# Patient Record
Sex: FEMALE | Race: WHITE | NOT HISPANIC OR LATINO | Employment: FULL TIME | URBAN - NONMETROPOLITAN AREA
[De-identification: names, ages, dates, MRNs, and addresses within clinical notes are randomized per-mention and may not be internally consistent; named-entity substitution may affect disease eponyms.]

---

## 2018-02-27 ENCOUNTER — APPOINTMENT (OUTPATIENT)
Dept: CT IMAGING | Facility: HOSPITAL | Age: 48
End: 2018-02-27

## 2018-02-27 ENCOUNTER — APPOINTMENT (OUTPATIENT)
Dept: GENERAL RADIOLOGY | Facility: HOSPITAL | Age: 48
End: 2018-02-27

## 2018-02-27 ENCOUNTER — HOSPITAL ENCOUNTER (INPATIENT)
Facility: HOSPITAL | Age: 48
LOS: 1 days | Discharge: HOME | End: 2018-02-28
Attending: EMERGENCY MEDICINE | Admitting: FAMILY MEDICINE

## 2018-02-27 ENCOUNTER — APPOINTMENT (OUTPATIENT)
Dept: MRI IMAGING | Facility: HOSPITAL | Age: 48
End: 2018-02-27

## 2018-02-27 DIAGNOSIS — I63.9 CEREBROVASCULAR ACCIDENT (CVA), UNSPECIFIED MECHANISM (HCC): Primary | ICD-10-CM

## 2018-02-27 DIAGNOSIS — Z74.09 IMPAIRED MOBILITY AND ADLS: ICD-10-CM

## 2018-02-27 DIAGNOSIS — Z78.9 IMPAIRED MOBILITY AND ADLS: ICD-10-CM

## 2018-02-27 DIAGNOSIS — Z74.09 IMPAIRED MOBILITY: ICD-10-CM

## 2018-02-27 LAB
ABO GROUP BLD: NORMAL
ALBUMIN SERPL-MCNC: 4.3 G/DL (ref 3.5–5)
ALBUMIN/GLOB SERPL: 1.5 G/DL (ref 1.1–2.5)
ALP SERPL-CCNC: 55 U/L (ref 24–120)
ALT SERPL W P-5'-P-CCNC: 18 U/L (ref 0–54)
ANION GAP SERPL CALCULATED.3IONS-SCNC: 11 MMOL/L (ref 4–13)
APTT PPP: 33 SECONDS (ref 24.1–34.8)
AST SERPL-CCNC: 28 U/L (ref 7–45)
AT III PPP CHRO-ACNC: 95 % (ref 84–123)
BACTERIA UR QL AUTO: ABNORMAL /HPF
BASOPHILS # BLD AUTO: 0.06 10*3/MM3 (ref 0–0.2)
BASOPHILS NFR BLD AUTO: 0.5 % (ref 0–2)
BILIRUB SERPL-MCNC: 0.5 MG/DL (ref 0.1–1)
BILIRUB UR QL STRIP: NEGATIVE
BLD GP AB SCN SERPL QL: NEGATIVE
BUN BLD-MCNC: 14 MG/DL (ref 5–21)
BUN/CREAT SERPL: 17.5 (ref 7–25)
CALCIUM SPEC-SCNC: 9.3 MG/DL (ref 8.4–10.4)
CHLORIDE SERPL-SCNC: 102 MMOL/L (ref 98–110)
CLARITY UR: CLEAR
CO2 SERPL-SCNC: 27 MMOL/L (ref 24–31)
COLOR UR: YELLOW
CREAT BLD-MCNC: 0.8 MG/DL (ref 0.5–1.4)
D DIMER PPP FEU-MCNC: 1.35 MG/L (FEU) (ref 0–0.5)
DEPRECATED RDW RBC AUTO: 38.5 FL (ref 40–54)
EOSINOPHIL # BLD AUTO: 0.07 10*3/MM3 (ref 0–0.7)
EOSINOPHIL NFR BLD AUTO: 0.6 % (ref 0–4)
ERYTHROCYTE [DISTWIDTH] IN BLOOD BY AUTOMATED COUNT: 12.3 % (ref 12–15)
ERYTHROCYTE [SEDIMENTATION RATE] IN BLOOD: 6 MM/HR (ref 0–20)
GFR SERPL CREATININE-BSD FRML MDRD: 76 ML/MIN/1.73
GLOBULIN UR ELPH-MCNC: 2.9 GM/DL
GLUCOSE BLD-MCNC: 98 MG/DL (ref 70–100)
GLUCOSE BLDC GLUCOMTR-MCNC: 93 MG/DL (ref 70–130)
GLUCOSE UR STRIP-MCNC: NEGATIVE MG/DL
HCT VFR BLD AUTO: 40.2 % (ref 37–47)
HGB BLD-MCNC: 13.9 G/DL (ref 12–16)
HGB UR QL STRIP.AUTO: ABNORMAL
HOLD SPECIMEN: NORMAL
HOLD SPECIMEN: NORMAL
HYALINE CASTS UR QL AUTO: ABNORMAL /LPF
IMM GRANULOCYTES # BLD: 0.03 10*3/MM3 (ref 0–0.03)
IMM GRANULOCYTES NFR BLD: 0.3 % (ref 0–5)
INR PPP: 0.94 (ref 0.91–1.09)
KETONES UR QL STRIP: NEGATIVE
LEUKOCYTE ESTERASE UR QL STRIP.AUTO: NEGATIVE
LYMPHOCYTES # BLD AUTO: 2.57 10*3/MM3 (ref 0.72–4.86)
LYMPHOCYTES NFR BLD AUTO: 21.8 % (ref 15–45)
MCH RBC QN AUTO: 29.8 PG (ref 28–32)
MCHC RBC AUTO-ENTMCNC: 34.6 G/DL (ref 33–36)
MCV RBC AUTO: 86.1 FL (ref 82–98)
MONOCYTES # BLD AUTO: 0.82 10*3/MM3 (ref 0.19–1.3)
MONOCYTES NFR BLD AUTO: 7 % (ref 4–12)
NEUTROPHILS # BLD AUTO: 8.24 10*3/MM3 (ref 1.87–8.4)
NEUTROPHILS NFR BLD AUTO: 69.8 % (ref 39–78)
NITRITE UR QL STRIP: NEGATIVE
NRBC BLD MANUAL-RTO: 0 /100 WBC (ref 0–0)
PH UR STRIP.AUTO: 7 [PH] (ref 5–8)
PLATELET # BLD AUTO: 326 10*3/MM3 (ref 130–400)
PMV BLD AUTO: 8.7 FL (ref 6–12)
POTASSIUM BLD-SCNC: 4.3 MMOL/L (ref 3.5–5.3)
PROT SERPL-MCNC: 7.2 G/DL (ref 6.3–8.7)
PROT UR QL STRIP: NEGATIVE
PROTHROMBIN TIME: 12.8 SECONDS (ref 11.9–14.6)
RBC # BLD AUTO: 4.67 10*6/MM3 (ref 4.2–5.4)
RBC # UR: ABNORMAL /HPF
REF LAB TEST METHOD: ABNORMAL
RH BLD: POSITIVE
SODIUM BLD-SCNC: 140 MMOL/L (ref 135–145)
SP GR UR STRIP: 1.01 (ref 1–1.03)
SQUAMOUS #/AREA URNS HPF: ABNORMAL /HPF
TROPONIN I SERPL-MCNC: <0.012 NG/ML (ref 0–0.03)
UROBILINOGEN UR QL STRIP: ABNORMAL
WBC NRBC COR # BLD: 11.79 10*3/MM3 (ref 4.8–10.8)
WBC UR QL AUTO: ABNORMAL /HPF
WHOLE BLOOD HOLD SPECIMEN: NORMAL
WHOLE BLOOD HOLD SPECIMEN: NORMAL

## 2018-02-27 PROCEDURE — 80053 COMPREHEN METABOLIC PANEL: CPT | Performed by: EMERGENCY MEDICINE

## 2018-02-27 PROCEDURE — 85730 THROMBOPLASTIN TIME PARTIAL: CPT | Performed by: EMERGENCY MEDICINE

## 2018-02-27 PROCEDURE — 85705 THROMBOPLASTIN INHIBITION: CPT | Performed by: PSYCHIATRY & NEUROLOGY

## 2018-02-27 PROCEDURE — 86235 NUCLEAR ANTIGEN ANTIBODY: CPT | Performed by: PSYCHIATRY & NEUROLOGY

## 2018-02-27 PROCEDURE — 70450 CT HEAD/BRAIN W/O DYE: CPT

## 2018-02-27 PROCEDURE — 85305 CLOT INHIBIT PROT S TOTAL: CPT | Performed by: PSYCHIATRY & NEUROLOGY

## 2018-02-27 PROCEDURE — 84484 ASSAY OF TROPONIN QUANT: CPT | Performed by: EMERGENCY MEDICINE

## 2018-02-27 PROCEDURE — 86901 BLOOD TYPING SEROLOGIC RH(D): CPT | Performed by: EMERGENCY MEDICINE

## 2018-02-27 PROCEDURE — 85302 CLOT INHIBIT PROT C ANTIGEN: CPT | Performed by: PSYCHIATRY & NEUROLOGY

## 2018-02-27 PROCEDURE — 70498 CT ANGIOGRAPHY NECK: CPT

## 2018-02-27 PROCEDURE — 25010000002 DIPHENHYDRAMINE PER 50 MG: Performed by: PSYCHIATRY & NEUROLOGY

## 2018-02-27 PROCEDURE — 85670 THROMBIN TIME PLASMA: CPT | Performed by: PSYCHIATRY & NEUROLOGY

## 2018-02-27 PROCEDURE — 85303 CLOT INHIBIT PROT C ACTIVITY: CPT | Performed by: PSYCHIATRY & NEUROLOGY

## 2018-02-27 PROCEDURE — 86850 RBC ANTIBODY SCREEN: CPT | Performed by: EMERGENCY MEDICINE

## 2018-02-27 PROCEDURE — 36415 COLL VENOUS BLD VENIPUNCTURE: CPT | Performed by: PSYCHIATRY & NEUROLOGY

## 2018-02-27 PROCEDURE — 85610 PROTHROMBIN TIME: CPT | Performed by: EMERGENCY MEDICINE

## 2018-02-27 PROCEDURE — 25010000002 METHYLPREDNISOLONE PER 125 MG: Performed by: FAMILY MEDICINE

## 2018-02-27 PROCEDURE — 99285 EMERGENCY DEPT VISIT HI MDM: CPT

## 2018-02-27 PROCEDURE — 86225 DNA ANTIBODY NATIVE: CPT | Performed by: PSYCHIATRY & NEUROLOGY

## 2018-02-27 PROCEDURE — 85651 RBC SED RATE NONAUTOMATED: CPT | Performed by: PSYCHIATRY & NEUROLOGY

## 2018-02-27 PROCEDURE — 25010000002 DIPHENHYDRAMINE PER 50 MG: Performed by: FAMILY MEDICINE

## 2018-02-27 PROCEDURE — 86900 BLOOD TYPING SEROLOGIC ABO: CPT | Performed by: EMERGENCY MEDICINE

## 2018-02-27 PROCEDURE — 85732 THROMBOPLASTIN TIME PARTIAL: CPT | Performed by: PSYCHIATRY & NEUROLOGY

## 2018-02-27 PROCEDURE — 83090 ASSAY OF HOMOCYSTEINE: CPT | Performed by: PSYCHIATRY & NEUROLOGY

## 2018-02-27 PROCEDURE — 86147 CARDIOLIPIN ANTIBODY EA IG: CPT | Performed by: PSYCHIATRY & NEUROLOGY

## 2018-02-27 PROCEDURE — 81001 URINALYSIS AUTO W/SCOPE: CPT | Performed by: PSYCHIATRY & NEUROLOGY

## 2018-02-27 PROCEDURE — 71045 X-RAY EXAM CHEST 1 VIEW: CPT

## 2018-02-27 PROCEDURE — 86146 BETA-2 GLYCOPROTEIN ANTIBODY: CPT | Performed by: PSYCHIATRY & NEUROLOGY

## 2018-02-27 PROCEDURE — 93005 ELECTROCARDIOGRAM TRACING: CPT | Performed by: EMERGENCY MEDICINE

## 2018-02-27 PROCEDURE — 85306 CLOT INHIBIT PROT S FREE: CPT | Performed by: PSYCHIATRY & NEUROLOGY

## 2018-02-27 PROCEDURE — 70551 MRI BRAIN STEM W/O DYE: CPT

## 2018-02-27 PROCEDURE — 85379 FIBRIN DEGRADATION QUANT: CPT | Performed by: PSYCHIATRY & NEUROLOGY

## 2018-02-27 PROCEDURE — 99284 EMERGENCY DEPT VISIT MOD MDM: CPT | Performed by: PSYCHIATRY & NEUROLOGY

## 2018-02-27 PROCEDURE — 93010 ELECTROCARDIOGRAM REPORT: CPT | Performed by: INTERNAL MEDICINE

## 2018-02-27 PROCEDURE — 85300 ANTITHROMBIN III ACTIVITY: CPT | Performed by: PSYCHIATRY & NEUROLOGY

## 2018-02-27 PROCEDURE — 87086 URINE CULTURE/COLONY COUNT: CPT | Performed by: PSYCHIATRY & NEUROLOGY

## 2018-02-27 PROCEDURE — 0 IOPAMIDOL PER 1 ML: Performed by: EMERGENCY MEDICINE

## 2018-02-27 PROCEDURE — 70496 CT ANGIOGRAPHY HEAD: CPT

## 2018-02-27 PROCEDURE — 85613 RUSSELL VIPER VENOM DILUTED: CPT | Performed by: PSYCHIATRY & NEUROLOGY

## 2018-02-27 PROCEDURE — 85025 COMPLETE CBC W/AUTO DIFF WBC: CPT | Performed by: EMERGENCY MEDICINE

## 2018-02-27 PROCEDURE — 82962 GLUCOSE BLOOD TEST: CPT

## 2018-02-27 PROCEDURE — 81241 F5 GENE: CPT | Performed by: PSYCHIATRY & NEUROLOGY

## 2018-02-27 RX ORDER — LISINOPRIL 10 MG/1
10 TABLET ORAL DAILY
COMMUNITY
End: 2018-02-28 | Stop reason: HOSPADM

## 2018-02-27 RX ORDER — SODIUM CHLORIDE 0.9 % (FLUSH) 0.9 %
10 SYRINGE (ML) INJECTION AS NEEDED
Status: DISCONTINUED | OUTPATIENT
Start: 2018-02-27 | End: 2018-02-27

## 2018-02-27 RX ORDER — METHYLPREDNISOLONE SODIUM SUCCINATE 125 MG/2ML
125 INJECTION, POWDER, LYOPHILIZED, FOR SOLUTION INTRAMUSCULAR; INTRAVENOUS ONCE
Status: COMPLETED | OUTPATIENT
Start: 2018-02-27 | End: 2018-02-27

## 2018-02-27 RX ORDER — FLUOXETINE HYDROCHLORIDE 20 MG/1
20 CAPSULE ORAL TAKE AS DIRECTED
COMMUNITY

## 2018-02-27 RX ORDER — METHYLPREDNISOLONE SODIUM SUCCINATE 125 MG/2ML
125 INJECTION, POWDER, LYOPHILIZED, FOR SOLUTION INTRAMUSCULAR; INTRAVENOUS EVERY 6 HOURS
Status: DISCONTINUED | OUTPATIENT
Start: 2018-02-27 | End: 2018-02-28 | Stop reason: HOSPADM

## 2018-02-27 RX ORDER — LISINOPRIL 10 MG/1
10 TABLET ORAL DAILY
Status: DISCONTINUED | OUTPATIENT
Start: 2018-02-27 | End: 2018-02-27

## 2018-02-27 RX ORDER — ACETAMINOPHEN 325 MG/1
650 TABLET ORAL EVERY 4 HOURS PRN
Status: DISCONTINUED | OUTPATIENT
Start: 2018-02-27 | End: 2018-02-28 | Stop reason: HOSPADM

## 2018-02-27 RX ORDER — ASPIRIN 81 MG/1
81 TABLET, CHEWABLE ORAL DAILY
Status: DISCONTINUED | OUTPATIENT
Start: 2018-02-27 | End: 2018-02-28 | Stop reason: HOSPADM

## 2018-02-27 RX ORDER — KETOROLAC TROMETHAMINE 10 MG/1
10 TABLET, FILM COATED ORAL EVERY 6 HOURS PRN
Status: ON HOLD | COMMUNITY
End: 2020-07-30

## 2018-02-27 RX ORDER — ACETAMINOPHEN 650 MG/1
650 SUPPOSITORY RECTAL EVERY 4 HOURS PRN
Status: DISCONTINUED | OUTPATIENT
Start: 2018-02-27 | End: 2018-02-28 | Stop reason: HOSPADM

## 2018-02-27 RX ORDER — DIPHENHYDRAMINE HYDROCHLORIDE 50 MG/ML
25 INJECTION INTRAMUSCULAR; INTRAVENOUS EVERY 6 HOURS
Status: DISCONTINUED | OUTPATIENT
Start: 2018-02-27 | End: 2018-02-28 | Stop reason: HOSPADM

## 2018-02-27 RX ORDER — SODIUM CHLORIDE 0.9 % (FLUSH) 0.9 %
1-10 SYRINGE (ML) INJECTION AS NEEDED
Status: DISCONTINUED | OUTPATIENT
Start: 2018-02-27 | End: 2018-02-28 | Stop reason: HOSPADM

## 2018-02-27 RX ORDER — ASPIRIN 300 MG/1
300 SUPPOSITORY RECTAL DAILY
Status: DISCONTINUED | OUTPATIENT
Start: 2018-02-27 | End: 2018-02-28 | Stop reason: HOSPADM

## 2018-02-27 RX ORDER — ATORVASTATIN CALCIUM 40 MG/1
80 TABLET, FILM COATED ORAL NIGHTLY
Status: DISCONTINUED | OUTPATIENT
Start: 2018-02-27 | End: 2018-02-28 | Stop reason: HOSPADM

## 2018-02-27 RX ADMIN — ASPIRIN 81 MG 81 MG: 81 TABLET ORAL at 15:23

## 2018-02-27 RX ADMIN — DESMOPRESSIN ACETATE 80 MG: 0.2 TABLET ORAL at 20:07

## 2018-02-27 RX ADMIN — METHYLPREDNISOLONE SODIUM SUCCINATE 125 MG: 125 INJECTION, POWDER, FOR SOLUTION INTRAMUSCULAR; INTRAVENOUS at 16:49

## 2018-02-27 RX ADMIN — DIPHENHYDRAMINE HYDROCHLORIDE 25 MG: 50 INJECTION, SOLUTION INTRAMUSCULAR; INTRAVENOUS at 12:05

## 2018-02-27 RX ADMIN — METHYLPREDNISOLONE SODIUM SUCCINATE 125 MG: 125 INJECTION, POWDER, FOR SOLUTION INTRAMUSCULAR; INTRAVENOUS at 23:05

## 2018-02-27 RX ADMIN — LISINOPRIL 10 MG: 10 TABLET ORAL at 15:23

## 2018-02-27 RX ADMIN — IOPAMIDOL 100 ML: 755 INJECTION, SOLUTION INTRAVENOUS at 12:46

## 2018-02-27 RX ADMIN — DIPHENHYDRAMINE HYDROCHLORIDE 25 MG: 50 INJECTION, SOLUTION INTRAMUSCULAR; INTRAVENOUS at 20:02

## 2018-02-28 ENCOUNTER — APPOINTMENT (OUTPATIENT)
Dept: CARDIOLOGY | Facility: HOSPITAL | Age: 48
End: 2018-02-28
Attending: PSYCHIATRY & NEUROLOGY

## 2018-02-28 ENCOUNTER — APPOINTMENT (OUTPATIENT)
Dept: ULTRASOUND IMAGING | Facility: HOSPITAL | Age: 48
End: 2018-02-28

## 2018-02-28 VITALS
RESPIRATION RATE: 18 BRPM | BODY MASS INDEX: 30.33 KG/M2 | HEART RATE: 80 BPM | SYSTOLIC BLOOD PRESSURE: 128 MMHG | TEMPERATURE: 97.5 F | HEIGHT: 63 IN | OXYGEN SATURATION: 95 % | WEIGHT: 171.2 LBS | DIASTOLIC BLOOD PRESSURE: 77 MMHG

## 2018-02-28 LAB
ARTICHOKE IGE QN: 172 MG/DL (ref 0–99)
BH CV ECHO MEAS - AO MAX PG (FULL): 0.77 MMHG
BH CV ECHO MEAS - AO MAX PG: 7.1 MMHG
BH CV ECHO MEAS - AO MEAN PG (FULL): 1 MMHG
BH CV ECHO MEAS - AO MEAN PG: 4 MMHG
BH CV ECHO MEAS - AO ROOT AREA (BSA CORRECTED): 1.7
BH CV ECHO MEAS - AO ROOT AREA: 7.5 CM^2
BH CV ECHO MEAS - AO ROOT DIAM: 3.1 CM
BH CV ECHO MEAS - AO V2 MAX: 133 CM/SEC
BH CV ECHO MEAS - AO V2 MEAN: 98.7 CM/SEC
BH CV ECHO MEAS - AO V2 VTI: 27 CM
BH CV ECHO MEAS - AVA(I,A): 2.5 CM^2
BH CV ECHO MEAS - AVA(I,D): 2.5 CM^2
BH CV ECHO MEAS - AVA(V,A): 2.7 CM^2
BH CV ECHO MEAS - AVA(V,D): 2.7 CM^2
BH CV ECHO MEAS - BSA(HAYCOCK): 1.9 M^2
BH CV ECHO MEAS - BSA: 1.8 M^2
BH CV ECHO MEAS - BZI_BMI: 30.3 KILOGRAMS/M^2
BH CV ECHO MEAS - BZI_METRIC_HEIGHT: 160 CM
BH CV ECHO MEAS - BZI_METRIC_WEIGHT: 77.6 KG
BH CV ECHO MEAS - CONTRAST EF 4CH: 71.3 ML/M^2
BH CV ECHO MEAS - EDV(CUBED): 69.9 ML
BH CV ECHO MEAS - EDV(MOD-SP4): 75.5 ML
BH CV ECHO MEAS - EDV(TEICH): 75.1 ML
BH CV ECHO MEAS - EF(CUBED): 59.4 %
BH CV ECHO MEAS - EF(MOD-SP4): 71.3 %
BH CV ECHO MEAS - EF(TEICH): 51.5 %
BH CV ECHO MEAS - ESV(CUBED): 28.4 ML
BH CV ECHO MEAS - ESV(MOD-SP4): 21.7 ML
BH CV ECHO MEAS - ESV(TEICH): 36.4 ML
BH CV ECHO MEAS - FS: 26 %
BH CV ECHO MEAS - IVS/LVPW: 1
BH CV ECHO MEAS - IVSD: 0.66 CM
BH CV ECHO MEAS - LA DIMENSION: 2.4 CM
BH CV ECHO MEAS - LA/AO: 0.77
BH CV ECHO MEAS - LV DIASTOLIC VOL/BSA (35-75): 41.7 ML/M^2
BH CV ECHO MEAS - LV MASS(C)D: 74.3 GRAMS
BH CV ECHO MEAS - LV MASS(C)DI: 41.1 GRAMS/M^2
BH CV ECHO MEAS - LV MAX PG: 6.3 MMHG
BH CV ECHO MEAS - LV MEAN PG: 3 MMHG
BH CV ECHO MEAS - LV SYSTOLIC VOL/BSA (12-30): 12 ML/M^2
BH CV ECHO MEAS - LV V1 MAX: 125.5 CM/SEC
BH CV ECHO MEAS - LV V1 MEAN: 82.9 CM/SEC
BH CV ECHO MEAS - LV V1 VTI: 24 CM
BH CV ECHO MEAS - LVIDD: 4.1 CM
BH CV ECHO MEAS - LVIDS: 3.1 CM
BH CV ECHO MEAS - LVLD AP4: 8.1 CM
BH CV ECHO MEAS - LVLS AP4: 6.2 CM
BH CV ECHO MEAS - LVOT AREA (M): 2.8 CM^2
BH CV ECHO MEAS - LVOT AREA: 2.8 CM^2
BH CV ECHO MEAS - LVOT DIAM: 1.9 CM
BH CV ECHO MEAS - LVPWD: 0.64 CM
BH CV ECHO MEAS - MV A MAX VEL: 94.6 CM/SEC
BH CV ECHO MEAS - MV DEC TIME: 0.21 SEC
BH CV ECHO MEAS - MV E MAX VEL: 73.5 CM/SEC
BH CV ECHO MEAS - MV E/A: 0.78
BH CV ECHO MEAS - SI(AO): 112.6 ML/M^2
BH CV ECHO MEAS - SI(CUBED): 23 ML/M^2
BH CV ECHO MEAS - SI(LVOT): 37.6 ML/M^2
BH CV ECHO MEAS - SI(MOD-SP4): 29.7 ML/M^2
BH CV ECHO MEAS - SI(TEICH): 21.4 ML/M^2
BH CV ECHO MEAS - SV(AO): 203.8 ML
BH CV ECHO MEAS - SV(CUBED): 41.6 ML
BH CV ECHO MEAS - SV(LVOT): 68 ML
BH CV ECHO MEAS - SV(MOD-SP4): 53.8 ML
BH CV ECHO MEAS - SV(TEICH): 38.6 ML
BH CV ECHO MEAS - TR MAX VEL: 142 CM/SEC
CARDIOLIPIN IGG SER IA-ACNC: <9 GPL U/ML (ref 0–14)
CARDIOLIPIN IGM SER IA-ACNC: 14 MPL U/ML (ref 0–12)
CENTROMERE B AB SER-ACNC: <0.2 AI (ref 0–0.9)
CHOLEST SERPL-MCNC: 222 MG/DL (ref 130–200)
CHROMATIN AB SERPL-ACNC: <0.2 AI (ref 0–0.9)
DSDNA AB SER-ACNC: <1 IU/ML (ref 0–9)
E/E' RATIO: 6.3
ENA JO1 AB SER-ACNC: <0.2 AI (ref 0–0.9)
ENA RNP AB SER-ACNC: 3.2 AI (ref 0–0.9)
ENA SCL70 AB SER-ACNC: <0.2 AI (ref 0–0.9)
ENA SM AB SER-ACNC: <0.2 AI (ref 0–0.9)
ENA SS-A AB SER-ACNC: <0.2 AI (ref 0–0.9)
ENA SS-B AB SER-ACNC: <0.2 AI (ref 0–0.9)
HBA1C MFR BLD: 5.3 %
HCYS SERPL-SCNC: 11.9 UMOL/L (ref 0–15)
HDLC SERPL-MCNC: 55 MG/DL
LDLC/HDLC SERPL: 2.8 {RATIO}
LEFT ATRIUM VOLUME INDEX: 13.1 ML/M2
LEFT ATRIUM VOLUME: 23.7 CM3
LV EF 2D ECHO EST: 70 %
Lab: ABNORMAL
MAXIMAL PREDICTED HEART RATE: 171 BPM
PROT S PPP-ACNC: 71 % (ref 60–150)
STRESS TARGET HR: 145 BPM
TRIGL SERPL-MCNC: 64 MG/DL (ref 0–149)

## 2018-02-28 PROCEDURE — 25010000002 DIPHENHYDRAMINE PER 50 MG: Performed by: FAMILY MEDICINE

## 2018-02-28 PROCEDURE — 97165 OT EVAL LOW COMPLEX 30 MIN: CPT

## 2018-02-28 PROCEDURE — 99233 SBSQ HOSP IP/OBS HIGH 50: CPT | Performed by: PSYCHIATRY & NEUROLOGY

## 2018-02-28 PROCEDURE — G8980 MOBILITY D/C STATUS: HCPCS

## 2018-02-28 PROCEDURE — G8978 MOBILITY CURRENT STATUS: HCPCS

## 2018-02-28 PROCEDURE — 25010000002 PERFLUTREN 6.52 MG/ML SUSPENSION: Performed by: FAMILY MEDICINE

## 2018-02-28 PROCEDURE — 93306 TTE W/DOPPLER COMPLETE: CPT

## 2018-02-28 PROCEDURE — 80061 LIPID PANEL: CPT | Performed by: FAMILY MEDICINE

## 2018-02-28 PROCEDURE — 25010000002 METHYLPREDNISOLONE PER 125 MG: Performed by: FAMILY MEDICINE

## 2018-02-28 PROCEDURE — 93306 TTE W/DOPPLER COMPLETE: CPT | Performed by: INTERNAL MEDICINE

## 2018-02-28 PROCEDURE — 97161 PT EVAL LOW COMPLEX 20 MIN: CPT

## 2018-02-28 PROCEDURE — 93970 EXTREMITY STUDY: CPT

## 2018-02-28 PROCEDURE — G8988 SELF CARE GOAL STATUS: HCPCS

## 2018-02-28 PROCEDURE — 93970 EXTREMITY STUDY: CPT | Performed by: SURGERY

## 2018-02-28 PROCEDURE — G8987 SELF CARE CURRENT STATUS: HCPCS

## 2018-02-28 PROCEDURE — G8989 SELF CARE D/C STATUS: HCPCS

## 2018-02-28 PROCEDURE — 83036 HEMOGLOBIN GLYCOSYLATED A1C: CPT | Performed by: FAMILY MEDICINE

## 2018-02-28 PROCEDURE — G8979 MOBILITY GOAL STATUS: HCPCS

## 2018-02-28 RX ORDER — ASPIRIN 81 MG/1
81 TABLET, CHEWABLE ORAL DAILY
Start: 2018-03-01 | End: 2018-03-02

## 2018-02-28 RX ORDER — METHYLPREDNISOLONE 4 MG/1
TABLET ORAL
Qty: 1 EACH | Refills: 0 | Status: SHIPPED | OUTPATIENT
Start: 2018-02-28 | End: 2018-03-03 | Stop reason: HOSPADM

## 2018-02-28 RX ORDER — ATORVASTATIN CALCIUM 80 MG/1
80 TABLET, FILM COATED ORAL NIGHTLY
Qty: 30 TABLET | Refills: 0 | Status: SHIPPED | OUTPATIENT
Start: 2018-02-28 | End: 2019-07-09 | Stop reason: SDUPTHER

## 2018-02-28 RX ADMIN — ASPIRIN 81 MG 81 MG: 81 TABLET ORAL at 09:53

## 2018-02-28 RX ADMIN — METHYLPREDNISOLONE SODIUM SUCCINATE 125 MG: 125 INJECTION, POWDER, FOR SOLUTION INTRAMUSCULAR; INTRAVENOUS at 05:07

## 2018-02-28 RX ADMIN — DIPHENHYDRAMINE HYDROCHLORIDE 25 MG: 50 INJECTION, SOLUTION INTRAMUSCULAR; INTRAVENOUS at 12:13

## 2018-02-28 RX ADMIN — DIPHENHYDRAMINE HYDROCHLORIDE 25 MG: 50 INJECTION, SOLUTION INTRAMUSCULAR; INTRAVENOUS at 00:57

## 2018-02-28 RX ADMIN — METHYLPREDNISOLONE SODIUM SUCCINATE 125 MG: 125 INJECTION, POWDER, FOR SOLUTION INTRAMUSCULAR; INTRAVENOUS at 17:35

## 2018-02-28 RX ADMIN — PERFLUTREN: 6.52 INJECTION, SUSPENSION INTRAVENOUS at 12:58

## 2018-02-28 RX ADMIN — METHYLPREDNISOLONE SODIUM SUCCINATE 125 MG: 125 INJECTION, POWDER, FOR SOLUTION INTRAMUSCULAR; INTRAVENOUS at 11:35

## 2018-02-28 RX ADMIN — FOLIC ACID-PYRIDOXINE-CYANOCOBALAMIN TAB 2.5-25-2 MG 1 TABLET: 2.5-25-2 TAB at 15:43

## 2018-02-28 RX ADMIN — DIPHENHYDRAMINE HYDROCHLORIDE 25 MG: 50 INJECTION, SOLUTION INTRAMUSCULAR; INTRAVENOUS at 18:08

## 2018-02-28 RX ADMIN — DIPHENHYDRAMINE HYDROCHLORIDE 25 MG: 50 INJECTION, SOLUTION INTRAMUSCULAR; INTRAVENOUS at 07:05

## 2018-03-01 LAB
B2 GLYCOPROT1 IGA SER-ACNC: <9 GPI IGA UNITS (ref 0–25)
B2 GLYCOPROT1 IGG SER-ACNC: <9 GPI IGG UNITS (ref 0–20)
B2 GLYCOPROT1 IGM SER-ACNC: <9 GPI IGM UNITS (ref 0–32)
BACTERIA SPEC AEROBE CULT: NORMAL
LA NT DPL PPP: 39.3 SEC (ref 0–55)
LA NT DPL/LA NT HPL PPP-RTO: 0.92 RATIO (ref 0–1.4)
LA NT PLATELET PPP: 37.2 SEC (ref 0–51.9)
LUPUS ANTICOAGULANT REFLEX: NORMAL
PROT S FREE PPP-ACNC: 115 % (ref 57–157)
PROTEIN S-FUNCTIONAL: 90 % (ref 63–140)
SCREEN DRVVT: 35.2 SEC (ref 0–47)
THROMBIN TIME: 18.2 SEC (ref 0–23)

## 2018-03-02 ENCOUNTER — HOSPITAL ENCOUNTER (OUTPATIENT)
Facility: HOSPITAL | Age: 48
Setting detail: OBSERVATION
Discharge: HOME | End: 2018-03-03
Attending: FAMILY MEDICINE | Admitting: INTERNAL MEDICINE

## 2018-03-02 DIAGNOSIS — T78.40XA ALLERGIC REACTION, INITIAL ENCOUNTER: Primary | ICD-10-CM

## 2018-03-02 LAB
ALBUMIN SERPL-MCNC: 3.6 G/DL (ref 3.5–5)
ALBUMIN/GLOB SERPL: 1.4 G/DL (ref 1.1–2.5)
ALP SERPL-CCNC: 40 U/L (ref 24–120)
ALT SERPL W P-5'-P-CCNC: 23 U/L (ref 0–54)
ANION GAP SERPL CALCULATED.3IONS-SCNC: 11 MMOL/L (ref 4–13)
AST SERPL-CCNC: 24 U/L (ref 7–45)
BASOPHILS # BLD AUTO: 0.02 10*3/MM3 (ref 0–0.2)
BASOPHILS NFR BLD AUTO: 0.1 % (ref 0–2)
BILIRUB SERPL-MCNC: 1 MG/DL (ref 0.1–1)
BUN BLD-MCNC: 20 MG/DL (ref 5–21)
BUN/CREAT SERPL: 23.8 (ref 7–25)
CALCIUM SPEC-SCNC: 8.9 MG/DL (ref 8.4–10.4)
CHLORIDE SERPL-SCNC: 98 MMOL/L (ref 98–110)
CO2 SERPL-SCNC: 31 MMOL/L (ref 24–31)
CREAT BLD-MCNC: 0.84 MG/DL (ref 0.5–1.4)
DEPRECATED RDW RBC AUTO: 39 FL (ref 40–54)
EOSINOPHIL # BLD AUTO: 0.13 10*3/MM3 (ref 0–0.7)
EOSINOPHIL NFR BLD AUTO: 0.9 % (ref 0–4)
ERYTHROCYTE [DISTWIDTH] IN BLOOD BY AUTOMATED COUNT: 12.5 % (ref 12–15)
GFR SERPL CREATININE-BSD FRML MDRD: 72 ML/MIN/1.73
GLOBULIN UR ELPH-MCNC: 2.6 GM/DL
GLUCOSE BLD-MCNC: 88 MG/DL (ref 70–100)
HCT VFR BLD AUTO: 42.5 % (ref 37–47)
HGB BLD-MCNC: 14.6 G/DL (ref 12–16)
IMM GRANULOCYTES # BLD: 0.04 10*3/MM3 (ref 0–0.03)
IMM GRANULOCYTES NFR BLD: 0.3 % (ref 0–5)
LYMPHOCYTES # BLD AUTO: 2.57 10*3/MM3 (ref 0.72–4.86)
LYMPHOCYTES NFR BLD AUTO: 18.6 % (ref 15–45)
MCH RBC QN AUTO: 29.5 PG (ref 28–32)
MCHC RBC AUTO-ENTMCNC: 34.4 G/DL (ref 33–36)
MCV RBC AUTO: 85.9 FL (ref 82–98)
MONOCYTES # BLD AUTO: 0.45 10*3/MM3 (ref 0.19–1.3)
MONOCYTES NFR BLD AUTO: 3.3 % (ref 4–12)
NEUTROPHILS # BLD AUTO: 10.58 10*3/MM3 (ref 1.87–8.4)
NEUTROPHILS NFR BLD AUTO: 76.8 % (ref 39–78)
NRBC BLD MANUAL-RTO: 0 /100 WBC (ref 0–0)
PLATELET # BLD AUTO: 350 10*3/MM3 (ref 130–400)
PMV BLD AUTO: 8.9 FL (ref 6–12)
POTASSIUM BLD-SCNC: 4.2 MMOL/L (ref 3.5–5.3)
PROT C AG PPP IA-ACNC: 106 % (ref 60–150)
PROT SERPL-MCNC: 6.2 G/DL (ref 6.3–8.7)
PRT C ACTIVITY (CHROMOGENIC): 136 %
RBC # BLD AUTO: 4.95 10*6/MM3 (ref 4.2–5.4)
SODIUM BLD-SCNC: 140 MMOL/L (ref 135–145)
WBC NRBC COR # BLD: 13.79 10*3/MM3 (ref 4.8–10.8)

## 2018-03-02 PROCEDURE — 96375 TX/PRO/DX INJ NEW DRUG ADDON: CPT

## 2018-03-02 PROCEDURE — 25010000002 DEXAMETHASONE PER 1 MG: Performed by: INTERNAL MEDICINE

## 2018-03-02 PROCEDURE — G0378 HOSPITAL OBSERVATION PER HR: HCPCS

## 2018-03-02 PROCEDURE — 25010000002 DIPHENHYDRAMINE PER 50 MG: Performed by: NURSE PRACTITIONER

## 2018-03-02 PROCEDURE — 80053 COMPREHEN METABOLIC PANEL: CPT | Performed by: NURSE PRACTITIONER

## 2018-03-02 PROCEDURE — 96374 THER/PROPH/DIAG INJ IV PUSH: CPT

## 2018-03-02 PROCEDURE — 25010000002 METHYLPREDNISOLONE PER 125 MG: Performed by: NURSE PRACTITIONER

## 2018-03-02 PROCEDURE — 96376 TX/PRO/DX INJ SAME DRUG ADON: CPT

## 2018-03-02 PROCEDURE — 85025 COMPLETE CBC W/AUTO DIFF WBC: CPT | Performed by: NURSE PRACTITIONER

## 2018-03-02 PROCEDURE — 96361 HYDRATE IV INFUSION ADD-ON: CPT

## 2018-03-02 PROCEDURE — 99284 EMERGENCY DEPT VISIT MOD MDM: CPT

## 2018-03-02 RX ORDER — METHYLPREDNISOLONE SODIUM SUCCINATE 125 MG/2ML
125 INJECTION, POWDER, LYOPHILIZED, FOR SOLUTION INTRAMUSCULAR; INTRAVENOUS ONCE
Status: COMPLETED | OUTPATIENT
Start: 2018-03-02 | End: 2018-03-02

## 2018-03-02 RX ORDER — ATORVASTATIN CALCIUM 40 MG/1
80 TABLET, FILM COATED ORAL NIGHTLY
Status: DISCONTINUED | OUTPATIENT
Start: 2018-03-02 | End: 2018-03-03 | Stop reason: HOSPADM

## 2018-03-02 RX ORDER — CALCIUM CARBONATE 200(500)MG
1 TABLET,CHEWABLE ORAL 2 TIMES DAILY PRN
Status: DISCONTINUED | OUTPATIENT
Start: 2018-03-02 | End: 2018-03-03 | Stop reason: HOSPADM

## 2018-03-02 RX ORDER — DEXAMETHASONE SODIUM PHOSPHATE 4 MG/ML
2 INJECTION, SOLUTION INTRA-ARTICULAR; INTRALESIONAL; INTRAMUSCULAR; INTRAVENOUS; SOFT TISSUE EVERY 6 HOURS SCHEDULED
Status: DISCONTINUED | OUTPATIENT
Start: 2018-03-02 | End: 2018-03-02

## 2018-03-02 RX ORDER — SODIUM CHLORIDE 0.9 % (FLUSH) 0.9 %
10 SYRINGE (ML) INJECTION AS NEEDED
Status: DISCONTINUED | OUTPATIENT
Start: 2018-03-02 | End: 2018-03-03 | Stop reason: HOSPADM

## 2018-03-02 RX ORDER — SODIUM CHLORIDE 9 MG/ML
75 INJECTION, SOLUTION INTRAVENOUS CONTINUOUS
Status: DISCONTINUED | OUTPATIENT
Start: 2018-03-02 | End: 2018-03-03 | Stop reason: HOSPADM

## 2018-03-02 RX ORDER — DEXAMETHASONE SODIUM PHOSPHATE 4 MG/ML
4 INJECTION, SOLUTION INTRA-ARTICULAR; INTRALESIONAL; INTRAMUSCULAR; INTRAVENOUS; SOFT TISSUE EVERY 6 HOURS
Status: DISCONTINUED | OUTPATIENT
Start: 2018-03-02 | End: 2018-03-03 | Stop reason: HOSPADM

## 2018-03-02 RX ORDER — ACETAMINOPHEN 325 MG/1
650 TABLET ORAL EVERY 4 HOURS PRN
Status: DISCONTINUED | OUTPATIENT
Start: 2018-03-02 | End: 2018-03-03 | Stop reason: HOSPADM

## 2018-03-02 RX ORDER — ONDANSETRON 2 MG/ML
4 INJECTION INTRAMUSCULAR; INTRAVENOUS EVERY 6 HOURS PRN
Status: DISCONTINUED | OUTPATIENT
Start: 2018-03-02 | End: 2018-03-03 | Stop reason: HOSPADM

## 2018-03-02 RX ORDER — HYDROXYZINE HYDROCHLORIDE 25 MG/1
25 TABLET, FILM COATED ORAL EVERY 6 HOURS SCHEDULED
Status: DISCONTINUED | OUTPATIENT
Start: 2018-03-02 | End: 2018-03-03 | Stop reason: HOSPADM

## 2018-03-02 RX ORDER — CALCIUM CARBONATE 200(500)MG
1 TABLET,CHEWABLE ORAL 2 TIMES DAILY PRN
COMMUNITY
End: 2021-07-02

## 2018-03-02 RX ORDER — CLOPIDOGREL BISULFATE 75 MG/1
75 TABLET ORAL DAILY
Status: DISCONTINUED | OUTPATIENT
Start: 2018-03-02 | End: 2018-03-03 | Stop reason: HOSPADM

## 2018-03-02 RX ORDER — DIPHENHYDRAMINE HYDROCHLORIDE 50 MG/ML
12.5 INJECTION INTRAMUSCULAR; INTRAVENOUS ONCE
Status: COMPLETED | OUTPATIENT
Start: 2018-03-02 | End: 2018-03-02

## 2018-03-02 RX ORDER — FAMOTIDINE 10 MG/ML
20 INJECTION, SOLUTION INTRAVENOUS ONCE
Status: COMPLETED | OUTPATIENT
Start: 2018-03-02 | End: 2018-03-02

## 2018-03-02 RX ORDER — SODIUM CHLORIDE 0.9 % (FLUSH) 0.9 %
1-10 SYRINGE (ML) INJECTION AS NEEDED
Status: DISCONTINUED | OUTPATIENT
Start: 2018-03-02 | End: 2018-03-03 | Stop reason: HOSPADM

## 2018-03-02 RX ORDER — FLUOXETINE 10 MG/1
10 CAPSULE ORAL TAKE AS DIRECTED
Status: DISCONTINUED | OUTPATIENT
Start: 2018-03-02 | End: 2018-03-02

## 2018-03-02 RX ORDER — FAMOTIDINE 10 MG/ML
20 INJECTION, SOLUTION INTRAVENOUS EVERY 12 HOURS SCHEDULED
Status: DISCONTINUED | OUTPATIENT
Start: 2018-03-02 | End: 2018-03-03 | Stop reason: HOSPADM

## 2018-03-02 RX ADMIN — FAMOTIDINE 20 MG: 10 INJECTION INTRAVENOUS at 09:45

## 2018-03-02 RX ADMIN — METHYLPREDNISOLONE SODIUM SUCCINATE 125 MG: 125 INJECTION, POWDER, FOR SOLUTION INTRAMUSCULAR; INTRAVENOUS at 09:41

## 2018-03-02 RX ADMIN — FAMOTIDINE 20 MG: 10 INJECTION, SOLUTION INTRAVENOUS at 20:20

## 2018-03-02 RX ADMIN — DEXAMETHASONE SODIUM PHOSPHATE 4 MG: 4 INJECTION, SOLUTION INTRAMUSCULAR; INTRAVENOUS at 20:20

## 2018-03-02 RX ADMIN — SODIUM CHLORIDE 75 ML/HR: 9 INJECTION, SOLUTION INTRAVENOUS at 14:34

## 2018-03-02 RX ADMIN — Medication 10 ML: at 20:21

## 2018-03-02 RX ADMIN — DESMOPRESSIN ACETATE 80 MG: 0.2 TABLET ORAL at 20:21

## 2018-03-02 RX ADMIN — DEXAMETHASONE SODIUM PHOSPHATE 4 MG: 4 INJECTION, SOLUTION INTRAMUSCULAR; INTRAVENOUS at 14:57

## 2018-03-02 RX ADMIN — HYDROXYZINE HYDROCHLORIDE 25 MG: 25 TABLET ORAL at 23:24

## 2018-03-02 RX ADMIN — HYDROXYZINE HYDROCHLORIDE 25 MG: 25 TABLET ORAL at 14:57

## 2018-03-02 RX ADMIN — DIPHENHYDRAMINE HYDROCHLORIDE 12.5 MG: 50 INJECTION, SOLUTION INTRAMUSCULAR; INTRAVENOUS at 09:45

## 2018-03-02 RX ADMIN — HYDROXYZINE HYDROCHLORIDE 25 MG: 25 TABLET ORAL at 18:14

## 2018-03-02 RX ADMIN — CLOPIDOGREL 75 MG: 75 TABLET, FILM COATED ORAL at 14:57

## 2018-03-02 NOTE — ED PROVIDER NOTES
Subjective   HPI Comments: Patient is a 47-year-old  female that presents to the ER today with complaint of allergic reaction.  Patient was admitted to this facility from February 27 of 02/20/2017 with CVA.  Interstate the patient resolution of her symptoms.  During her stay the patient also developed a bacterial rash as well as facial swelling.  The patient was given Benadryl and Solu-Medrol with some relief of her symptoms.  Patient was discharged home and advised to continue Benadryl Medrol Dosepak.  Patient states she has been taking Benadryl and a Medrol Dosepak last evening.  She states her symptoms are generally worse.  She denies any swelling to her face, she denies any difficult swallowing or shortness of breath.  She denies any throat swelling.  She denies any dysphagia.  The patient denies any new soaps or lotions or detergents at home.  She denies these symptoms occurring after receiving any type of IV contrast or other medications in the hospital.  She states that the symptoms began before she was started on any medications.  She presents to the ER today for further evaluation.  She presents ER today for further evaluation.    Patient is a 47 y.o. female presenting with allergic reaction.   History provided by:  Patient   used: No    Allergic Reaction   Presenting symptoms: itching and rash    Presenting symptoms: no difficulty breathing, no difficulty swallowing, no swelling and no wheezing    Severity:  Mild  Duration:  4 days  Prior allergic episodes:  No prior episodes  Context: not animal exposure, not chemicals, not cosmetics, not dairy/milk products, not eggs, not food allergies, not grass, not insect bite/sting, not jewelry/metal, not medications, not new detergents/soaps, not nuts and not poison ivy    Relieved by:  Nothing  Worsened by:  Nothing  Ineffective treatments:  None tried      Review of Systems   HENT: Negative for trouble swallowing.    Respiratory:  Negative for wheezing.    Skin: Positive for itching and rash.   All other systems reviewed and are negative.      Past Medical History:   Diagnosis Date   • Hypertension        Allergies   Allergen Reactions   • Aspirin Hives   • Lisinopril Swelling       Past Surgical History:   Procedure Laterality Date   • TUBAL ABDOMINAL LIGATION         No family history on file.    Social History     Social History   • Marital status:      Social History Main Topics   • Smoking status: Never Smoker   • Alcohol use No   • Drug use: No   • Sexual activity: Yes     Partners: Male           Objective   Physical Exam   Constitutional: She is oriented to person, place, and time. She appears well-developed and well-nourished.   HENT:   Head: Normocephalic and atraumatic.   Eyes: Conjunctivae are normal. Pupils are equal, round, and reactive to light.   Cardiovascular: Normal rate, regular rhythm and normal heart sounds.    Pulmonary/Chest: Effort normal and breath sounds normal.   Abdominal: Soft. Bowel sounds are normal.   Neurological: She is alert and oriented to person, place, and time.   Skin: Skin is warm and dry.        Psychiatric: She has a normal mood and affect.   Nursing note and vitals reviewed.      Procedures         ED Course  ED Course   Comment By Time   Pt also seen by Dr. Mobley today; pt symptoms only improved slightly with steroids, benadryl and pepcid. At this time call placed to hospitalist to discuss admission. Ami Polanco, APRN 03/02 1101   Case discussed with Dr. Marino per Dr. Mobley; pt will be admitted to hospitalist in stable cond at this time. Ami Polanco, APRN 03/02 1130   Pt updated and aware of plan. Ami Polanco, APRN 03/02 1130      No orders to display     Lab Results (last 24 hours)     Procedure Component Value Units Date/Time    CBC & Differential [[id number]] Collected:  03/02/18 0925    Specimen:  Blood Updated:  03/02/18 0938    Narrative:       The following orders  were created for panel order CBC & Differential.  Procedure                               Abnormality         Status                     ---------                               -----------         ------                     CBC Auto Differential[[id number]]        Abnormal            Final result                 Please view results for these tests on the individual orders.    Comprehensive Metabolic Panel [[id number]]  (Abnormal) Collected:  03/02/18 0925    Specimen:  Blood Updated:  03/02/18 0953     Glucose 88 mg/dL      BUN 20 mg/dL      Creatinine 0.84 mg/dL      Sodium 140 mmol/L      Potassium 4.2 mmol/L      Chloride 98 mmol/L      CO2 31.0 mmol/L      Calcium 8.9 mg/dL      Total Protein 6.2 (L) g/dL      Albumin 3.60 g/dL      ALT (SGPT) 23 U/L      AST (SGOT) 24 U/L      Alkaline Phosphatase 40 U/L      Total Bilirubin 1.0 mg/dL      eGFR Non African Amer 72 mL/min/1.73      Globulin 2.6 gm/dL      A/G Ratio 1.4 g/dL      BUN/Creatinine Ratio 23.8     Anion Gap 11.0 mmol/L     CBC Auto Differential [[id number]]  (Abnormal) Collected:  03/02/18 0925    Specimen:  Blood Updated:  03/02/18 0938     WBC 13.79 (H) 10*3/mm3      RBC 4.95 10*6/mm3      Hemoglobin 14.6 g/dL      Hematocrit 42.5 %      MCV 85.9 fL      MCH 29.5 pg      MCHC 34.4 g/dL      RDW 12.5 %      RDW-SD 39.0 (L) fl      MPV 8.9 fL      Platelets 350 10*3/mm3      Neutrophil % 76.8 %      Lymphocyte % 18.6 %      Monocyte % 3.3 (L) %      Eosinophil % 0.9 %      Basophil % 0.1 %      Immature Grans % 0.3 %      Neutrophils, Absolute 10.58 (H) 10*3/mm3      Lymphocytes, Absolute 2.57 10*3/mm3      Monocytes, Absolute 0.45 10*3/mm3      Eosinophils, Absolute 0.13 10*3/mm3      Basophils, Absolute 0.02 10*3/mm3      Immature Grans, Absolute 0.04 (H) 10*3/mm3      nRBC 0.0 /100 WBC                     MDM  Number of Diagnoses or Management Options  Allergic reaction, initial encounter: established and worsening     Amount and/or Complexity of Data  Reviewed  Clinical lab tests: ordered and reviewed  Review and summarize past medical records: yes  Discuss the patient with other providers: yes    Patient Progress  Patient progress: stable      Final diagnoses:   Allergic reaction, initial encounter            Ami Polanco, APRN  03/02/18 113

## 2018-03-02 NOTE — PLAN OF CARE
Problem: Patient Care Overview (Adult)  Goal: Plan of Care Review  Outcome: Ongoing (interventions implemented as appropriate)   03/02/18 1321   Coping/Psychosocial Response Interventions   Plan Of Care Reviewed With patient   Patient Care Overview   Progress progress toward functional goals is gradual   Outcome Evaluation   Outcome Summary/Follow up Plan Pt admit from ER with allergic reaction. Pt had taken lisinopril, took ASA today and made hives worse. medication given with little relief. cont to monitor.      Goal: Adult Individualization and Mutuality  Outcome: Ongoing (interventions implemented as appropriate)    Goal: Discharge Needs Assessment  Outcome: Ongoing (interventions implemented as appropriate)      Problem: Anaphylactic/Systemic Hypersensitivity Reaction (Adult)  Goal: Signs and Symptoms of Listed Potential Problems Will be Absent or Manageable (Anaphylactic/Systemic Hypersensitivity Reaction)  Outcome: Ongoing (interventions implemented as appropriate)

## 2018-03-02 NOTE — H&P
[facility] Medicine Services  HISTORY AND PHYSICAL    Date of Admission: 3/2/2018  Primary Care Physician: No Known Provider    Subjective     Chief Complaint: Diffuse rash, pruritus    History of Present Illness  This is a 47-year-old  female patient who resides in [place], [place].  She does not currently have a primary care physician.  She was just discharged from our facility on Wednesday, 2/28 by Dr. Olivia Marino.  She had presented to our facility on 2/27 with complaints of right facial numbness.  There was concern for possible stroke process.  MRI of the brain was done and showed a small focus of restricted diffusion in the region of the posterior limb of the left internal capsule.  She was seen by Dr. Paiz.  She was started on aspirin and atorvastatin.  She tells me today that the numbness of her right face has almost completely resolved.    The patient had also developed a rash during that admission and had some edema of her upper lip.  Her lisinopril was discontinued.  She was placed on a Medrol Dosepak at discharge.     Since going home, the rash is spread considerably.  She has had uncontrollable pruritus with it.  She has tried Benadryl in addition to the Medrol with little relief.  She perceives some slight difficulty with breathing yesterday.  No difficulty with swallowing.  No oral lesions.  No fevers, sweats, or chills.    She believes the rash started shortly after completing her MRI on 2/27.  The MRI was done without contrast.  She does not believe that she had taken any medications prior to the MRI.  Given that all this happened on her prior admission, I am having a hard time establishing a timeline on that.  The only medications that she received on her last admission were her regular home medications and aspirin and atorvastatin.  Lisinopril was discontinued.  She was not sent home on an alternative blood pressure medication.  She did have  a CTA of head and neck, but claims the rash was already noticeable prior to receiving iodinated contrast.    She does admit that the rash has worsened since taking a daily aspirin.  She does not want to take aspirin any longer.  She had tolerated full dose aspirin is remotely.  She does take Toradol sometimes for painful menstrual cycles.  She has not had problems with nonsteroidals in the past.    Dr. Mobley felt that she should be admitted for this.     Review of Systems   Otherwise complete ROS reviewed and negative except as mentioned in the HPI.    Past Medical History:   Past Medical History:   Diagnosis Date   • Hypertension      Past Surgical History:  Past Surgical History:   Procedure Laterality Date   • TUBAL ABDOMINAL LIGATION       Social History:  reports that she has never smoked. She does not have any smokeless tobacco history on file. She reports that she does not drink alcohol or use illicit drugs.    Family History: Her mother has breast cancer.  She had a questionable lesion of her own in her right breast that was biopsied and was benign.  She now has a titanium marker for subsequent mammograms.    Allergies:  Allergies   Allergen Reactions   • Aspirin Hives, Swelling and Rash   • Lisinopril Swelling and Rash     Medications:  Prior to Admission medications    Medication Sig Start Date End Date Taking? Authorizing Provider   atorvastatin (LIPITOR) 80 MG tablet Take 1 tablet by mouth Every Night. 2/28/18  Yes Olivia Marino, DO   calcium carbonate (TUMS) 500 MG chewable tablet Chew 1 tablet 2 (Two) Times a Day As Needed for Indigestion or Heartburn.   Yes Historical Provider, MD   FLUoxetine (PROzac) 10 MG capsule Take 10 mg by mouth Take As Directed. Only takes during menstrual cycle   Yes Historical Provider, MD   folic acid-pyridoxine-cyanocobalamin (FOLBIC) 2.5-25-2 MG tablet tablet Take 1 tablet by mouth Daily. 3/1/18  Yes Olivia Marino DO   ketorolac (TORADOL) 10 MG tablet Take 10  "mg by mouth Every 6 (Six) Hours As Needed for Moderate Pain  (takes monthly during menstrual cycle).   Yes Historical Provider, MD   MethylPREDNISolone (MEDROL, BEN,) 4 MG tablet Take as directed on package instructions. 2/28/18  Yes Olivia Marino DO   aspirin 81 MG chewable tablet Chew 1 tablet Daily. 3/1/18 3/2/18  Olivia Marino DO     Objective     Vital Signs: /68 (BP Location: Right arm, Patient Position: Sitting)  Pulse 81  Temp 97.2 °F (36.2 °C) (Temporal Artery )   Resp 18  Ht 160 cm (63\")  Wt 78.9 kg (174 lb)  LMP 02/06/2018  SpO2 98%  BMI 30.82 kg/m2  Physical Exam   Constitutional: She is oriented to person, place, and time. She appears well-developed and well-nourished.   Up on the side of bed.  No distress.  Nontoxic.  Looks well.  Discussed with her nurse, April.  No family present.   HENT:   Head: Normocephalic and atraumatic.   Eyes: EOM are normal. Pupils are equal, round, and reactive to light.   Cardiovascular: Normal rate and regular rhythm.    Pulmonary/Chest: Effort normal and breath sounds normal.   Musculoskeletal: Normal range of motion. She exhibits no edema.   Neurological: She is alert and oriented to person, place, and time. She displays normal reflexes. No cranial nerve deficit. She exhibits normal muscle tone.   Skin: Skin is warm and dry. Rash (Maculopapular, diffuse, most noticeable on the trunk and legs.  Spares the palms and soles.  Blanches.  No targetoid lesions.  No oral or posterior pharynx lesions.) noted.   Psychiatric: She has a normal mood and affect. Her behavior is normal. Judgment and thought content normal.     Results Reviewed:  Lab Results (last 24 hours)     Procedure Component Value Units Date/Time    CBC & Differential [[id number]] Collected:  03/02/18 0925    Specimen:  Blood Updated:  03/02/18 0938    Narrative:       The following orders were created for panel order CBC & Differential.  Procedure                               Abnormality "         Status                     ---------                               -----------         ------                     CBC Auto Differential[[id number]]        Abnormal            Final result                 Please view results for these tests on the individual orders.    CBC Auto Differential [[id number]]  (Abnormal) Collected:  03/02/18 0925    Specimen:  Blood Updated:  03/02/18 0938     WBC 13.79 (H) 10*3/mm3      RBC 4.95 10*6/mm3      Hemoglobin 14.6 g/dL      Hematocrit 42.5 %      MCV 85.9 fL      MCH 29.5 pg      MCHC 34.4 g/dL      RDW 12.5 %      RDW-SD 39.0 (L) fl      MPV 8.9 fL      Platelets 350 10*3/mm3      Neutrophil % 76.8 %      Lymphocyte % 18.6 %      Monocyte % 3.3 (L) %      Eosinophil % 0.9 %      Basophil % 0.1 %      Immature Grans % 0.3 %      Neutrophils, Absolute 10.58 (H) 10*3/mm3      Lymphocytes, Absolute 2.57 10*3/mm3      Monocytes, Absolute 0.45 10*3/mm3      Eosinophils, Absolute 0.13 10*3/mm3      Basophils, Absolute 0.02 10*3/mm3      Immature Grans, Absolute 0.04 (H) 10*3/mm3      nRBC 0.0 /100 WBC     Comprehensive Metabolic Panel [[id number]]  (Abnormal) Collected:  03/02/18 0925    Specimen:  Blood Updated:  03/02/18 0953     Glucose 88 mg/dL      BUN 20 mg/dL      Creatinine 0.84 mg/dL      Sodium 140 mmol/L      Potassium 4.2 mmol/L      Chloride 98 mmol/L      CO2 31.0 mmol/L      Calcium 8.9 mg/dL      Total Protein 6.2 (L) g/dL      Albumin 3.60 g/dL      ALT (SGPT) 23 U/L      AST (SGOT) 24 U/L      Alkaline Phosphatase 40 U/L      Total Bilirubin 1.0 mg/dL      eGFR Non African Amer 72 mL/min/1.73      Globulin 2.6 gm/dL      A/G Ratio 1.4 g/dL      BUN/Creatinine Ratio 23.8     Anion Gap 11.0 mmol/L         I have personally reviewed and interpreted the radiology studies and ECG obtained at time of admission.     Assessment / Plan     Assessment:   1.  Diffuse rash that is likely a drug eruption with pruritus, most likely secondary to aspirin.  2.  Recent diagnosis  of an acute ischemic stroke of the posterior limb of the left internal capsule.   3.  Hyperlipidemia.  4.  Systemic arterial hypertension.  5.  Probable patent foramen ovale, negative duplex of the lower extremities.    Plan:   She is admitted observation status by service here at [facility].  She complains of diffuse rash with uncontrollable pruritus.  Medrol Dosepak that was prescribed has done little to change the character of her rash.    She received Solu-Medrol, Pepcid, and Bendaryl in the ER.  I would plan to place her on IV Decadron so we can transition to it orally as an outpatient.  Continue Pepcid.  She does not believe that Benadryl has helped her much.  Try Atarax.    We should probably transition her aspirin to Plavix.  She believes that the rash started before her first aspirin administration, however, the rash has escalated and worsened with subsequently taking aspirin.  I highly doubt that lisinopril would cause a rash of this nature.  It sounds like she may have had a bit of angioedema while she was hospitalized recently.  However, she had taken lisinopril for quite some time.  This was stopped.  An alternative blood pressure medication was not selected as the patient was normotensive through the rest of her stay.    I reviewed her recent stroke workup.  She has small to moderate right to left shunt and probable PFO on echocardiogram.  She had a duplex of her bilateral lower extremities that failed to show any thrombus.  She had a CTA of the head and neck with and without contrast on 2/27 that showed no significant abnormalities.  Her hemoglobin A1c was normal at 5.3.  Lipid panel showed an LDL cholesterol of 172 and she was started on atorvastatin.  EKG was sinus rhythm and telemetry was also noted to be sinus rhythm.    SCDs for DVT prophylaxis.    Code Status: Full.     I discussed the patients findings and my recommendations with the patient and her nurse, April.    Estimated length  of stay is overnight.      Michael Benavidez DO   03/02/18   1:46 PM

## 2018-03-03 VITALS
HEIGHT: 63 IN | WEIGHT: 174 LBS | OXYGEN SATURATION: 98 % | RESPIRATION RATE: 18 BRPM | BODY MASS INDEX: 30.83 KG/M2 | HEART RATE: 64 BPM | SYSTOLIC BLOOD PRESSURE: 115 MMHG | DIASTOLIC BLOOD PRESSURE: 70 MMHG | TEMPERATURE: 98.1 F

## 2018-03-03 LAB
ALBUMIN SERPL-MCNC: 2.9 G/DL (ref 3.5–5)
ALBUMIN/GLOB SERPL: 1.2 G/DL (ref 1.1–2.5)
ALP SERPL-CCNC: 35 U/L (ref 24–120)
ALT SERPL W P-5'-P-CCNC: 21 U/L (ref 0–54)
ANION GAP SERPL CALCULATED.3IONS-SCNC: 5 MMOL/L (ref 4–13)
AST SERPL-CCNC: 17 U/L (ref 7–45)
BILIRUB SERPL-MCNC: 0.3 MG/DL (ref 0.1–1)
BUN BLD-MCNC: 18 MG/DL (ref 5–21)
BUN/CREAT SERPL: 25 (ref 7–25)
CALCIUM SPEC-SCNC: 8.5 MG/DL (ref 8.4–10.4)
CHLORIDE SERPL-SCNC: 104 MMOL/L (ref 98–110)
CO2 SERPL-SCNC: 29 MMOL/L (ref 24–31)
CREAT BLD-MCNC: 0.72 MG/DL (ref 0.5–1.4)
DEPRECATED RDW RBC AUTO: 39.8 FL (ref 40–54)
ERYTHROCYTE [DISTWIDTH] IN BLOOD BY AUTOMATED COUNT: 12.5 % (ref 12–15)
GFR SERPL CREATININE-BSD FRML MDRD: 86 ML/MIN/1.73
GLOBULIN UR ELPH-MCNC: 2.4 GM/DL
GLUCOSE BLD-MCNC: 118 MG/DL (ref 70–100)
HCT VFR BLD AUTO: 32.7 % (ref 37–47)
HGB BLD-MCNC: 10.9 G/DL (ref 12–16)
MCH RBC QN AUTO: 29.1 PG (ref 28–32)
MCHC RBC AUTO-ENTMCNC: 33.3 G/DL (ref 33–36)
MCV RBC AUTO: 87.2 FL (ref 82–98)
PLATELET # BLD AUTO: 282 10*3/MM3 (ref 130–400)
PMV BLD AUTO: 9.3 FL (ref 6–12)
POTASSIUM BLD-SCNC: 4.2 MMOL/L (ref 3.5–5.3)
PROT SERPL-MCNC: 5.3 G/DL (ref 6.3–8.7)
RBC # BLD AUTO: 3.75 10*6/MM3 (ref 4.2–5.4)
SODIUM BLD-SCNC: 138 MMOL/L (ref 135–145)
WBC NRBC COR # BLD: 10.62 10*3/MM3 (ref 4.8–10.8)

## 2018-03-03 PROCEDURE — 80053 COMPREHEN METABOLIC PANEL: CPT | Performed by: INTERNAL MEDICINE

## 2018-03-03 PROCEDURE — 85027 COMPLETE CBC AUTOMATED: CPT | Performed by: INTERNAL MEDICINE

## 2018-03-03 PROCEDURE — G0378 HOSPITAL OBSERVATION PER HR: HCPCS

## 2018-03-03 PROCEDURE — 25010000002 DEXAMETHASONE PER 1 MG: Performed by: INTERNAL MEDICINE

## 2018-03-03 PROCEDURE — 96361 HYDRATE IV INFUSION ADD-ON: CPT

## 2018-03-03 PROCEDURE — 96376 TX/PRO/DX INJ SAME DRUG ADON: CPT

## 2018-03-03 RX ORDER — FAMOTIDINE 20 MG/1
20 TABLET, FILM COATED ORAL 2 TIMES DAILY
Qty: 10 TABLET | Refills: 0
Start: 2018-03-03 | End: 2018-03-08

## 2018-03-03 RX ORDER — DEXAMETHASONE 2 MG/1
TABLET ORAL
Qty: 17 TABLET | Refills: 0 | Status: SHIPPED | OUTPATIENT
Start: 2018-03-03 | End: 2018-04-13

## 2018-03-03 RX ORDER — HYDROXYZINE HYDROCHLORIDE 25 MG/1
25 TABLET, FILM COATED ORAL EVERY 6 HOURS PRN
Qty: 20 TABLET | Refills: 1 | Status: SHIPPED | OUTPATIENT
Start: 2018-03-03 | End: 2019-07-09

## 2018-03-03 RX ORDER — CLOPIDOGREL BISULFATE 75 MG/1
75 TABLET ORAL DAILY
Qty: 30 TABLET | Refills: 1 | Status: SHIPPED | OUTPATIENT
Start: 2018-03-04 | End: 2018-04-13

## 2018-03-03 RX ADMIN — FAMOTIDINE 20 MG: 10 INJECTION, SOLUTION INTRAVENOUS at 08:20

## 2018-03-03 RX ADMIN — HYDROXYZINE HYDROCHLORIDE 25 MG: 25 TABLET ORAL at 06:28

## 2018-03-03 RX ADMIN — CLOPIDOGREL 75 MG: 75 TABLET, FILM COATED ORAL at 08:20

## 2018-03-03 RX ADMIN — DEXAMETHASONE SODIUM PHOSPHATE 4 MG: 4 INJECTION, SOLUTION INTRAMUSCULAR; INTRAVENOUS at 02:55

## 2018-03-03 RX ADMIN — DEXAMETHASONE SODIUM PHOSPHATE 4 MG: 4 INJECTION, SOLUTION INTRAMUSCULAR; INTRAVENOUS at 08:20

## 2018-03-03 RX ADMIN — SODIUM CHLORIDE 75 ML/HR: 9 INJECTION, SOLUTION INTRAVENOUS at 02:55

## 2018-03-03 NOTE — PLAN OF CARE
Problem: Patient Care Overview (Adult)  Goal: Plan of Care Review  Outcome: Ongoing (interventions implemented as appropriate)   03/03/18 0346   Coping/Psychosocial Response Interventions   Plan Of Care Reviewed With patient   Patient Care Overview   Progress improving   Outcome Evaluation   Outcome Summary/Follow up Plan Red splotchy area's improving continues to itch pt reports its better. Cont. to monitor.       Problem: Anaphylactic/Systemic Hypersensitivity Reaction (Adult)  Goal: Signs and Symptoms of Listed Potential Problems Will be Absent or Manageable (Anaphylactic/Systemic Hypersensitivity Reaction)  Outcome: Ongoing (interventions implemented as appropriate)   03/03/18 0346   Anaphylactic/Systemic Hypersensitivity Reaction   Problems Assessed (Anaphylactic Reaction) all   Problems Present (Anaphylactic Reaction) dermatologic complications;situational response

## 2018-03-03 NOTE — DISCHARGE SUMMARY
[facility] Medicine Services  DISCHARGE SUMMARY       Date of Admission: 3/2/2018  Date of Discharge:  3/3/2018  Primary Care Physician: No Known Provider    Presenting Problem/History of Present Illness:  Rash with pruritis.     Final Discharge Diagnoses:  1.  Diffuse rash that is likely a drug eruption with pruritus, most likely secondary to aspirin.  2.  Recent diagnosis of an acute ischemic stroke of the posterior limb of the left internal capsule.   3.  Hyperlipidemia.  4.  Systemic arterial hypertension.  5.  Probable patent foramen ovale, negative duplex of the lower extremities.    Consults: None.     Procedures Performed: None.     Pertinent Test Results:     Lab Results (last 7 days)     Procedure Component Value Units Date/Time    CBC Auto Differential [[id number]]  (Abnormal) Collected:  03/02/18 0925    Specimen:  Blood Updated:  03/02/18 0938     WBC 13.79 (H) 10*3/mm3      RBC 4.95 10*6/mm3      Hemoglobin 14.6 g/dL      Hematocrit 42.5 %      MCV 85.9 fL      MCH 29.5 pg      MCHC 34.4 g/dL      RDW 12.5 %      RDW-SD 39.0 (L) fl      MPV 8.9 fL      Platelets 350 10*3/mm3      Neutrophil % 76.8 %      Lymphocyte % 18.6 %      Monocyte % 3.3 (L) %      Eosinophil % 0.9 %      Basophil % 0.1 %      Immature Grans % 0.3 %      Neutrophils, Absolute 10.58 (H) 10*3/mm3      Lymphocytes, Absolute 2.57 10*3/mm3      Monocytes, Absolute 0.45 10*3/mm3      Eosinophils, Absolute 0.13 10*3/mm3      Basophils, Absolute 0.02 10*3/mm3      Immature Grans, Absolute 0.04 (H) 10*3/mm3      nRBC 0.0 /100 WBC     Comprehensive Metabolic Panel [[id number]]  (Abnormal) Collected:  03/02/18 0925    Specimen:  Blood Updated:  03/02/18 0953     Glucose 88 mg/dL      BUN 20 mg/dL      Creatinine 0.84 mg/dL      Sodium 140 mmol/L      Potassium 4.2 mmol/L      Chloride 98 mmol/L      CO2 31.0 mmol/L      Calcium 8.9 mg/dL      Total Protein 6.2 (L) g/dL      Albumin 3.60 g/dL      ALT  (SGPT) 23 U/L      AST (SGOT) 24 U/L      Alkaline Phosphatase 40 U/L      Total Bilirubin 1.0 mg/dL      eGFR Non African Amer 72 mL/min/1.73      Globulin 2.6 gm/dL      A/G Ratio 1.4 g/dL      BUN/Creatinine Ratio 23.8     Anion Gap 11.0 mmol/L     Comprehensive Metabolic Panel [[id number]]  (Abnormal) Collected:  03/03/18 0455    Specimen:  Blood Updated:  03/03/18 0606     Glucose 118 (H) mg/dL      BUN 18 mg/dL      Creatinine 0.72 mg/dL      Sodium 138 mmol/L      Potassium 4.2 mmol/L      Chloride 104 mmol/L      CO2 29.0 mmol/L      Calcium 8.5 mg/dL      Total Protein 5.3 (L) g/dL      Albumin 2.90 (L) g/dL      ALT (SGPT) 21 U/L      AST (SGOT) 17 U/L      Alkaline Phosphatase 35 U/L      Total Bilirubin 0.3 mg/dL      eGFR Non African Amer 86 mL/min/1.73      Globulin 2.4 gm/dL      A/G Ratio 1.2 g/dL      BUN/Creatinine Ratio 25.0     Anion Gap 5.0 mmol/L     CBC (No Diff) [[id number]]  (Abnormal) Collected:  03/03/18 0455    Specimen:  Blood Updated:  03/03/18 0643     WBC 10.62 10*3/mm3      RBC 3.75 (L) 10*6/mm3      Hemoglobin 10.9 (L) g/dL      Hematocrit 32.7 (L) %      MCV 87.2 fL      MCH 29.1 pg      MCHC 33.3 g/dL      RDW 12.5 %      RDW-SD 39.8 (L) fl      MPV 9.3 fL      Platelets 282 10*3/mm3         Hospital Course:  Initial Visit:   This is a 47-year-old  female patient who resides in [place], [place].  She does not currently have a primary care physician.  She was just discharged from our facility on Wednesday, 2/28 by Dr. Olivia Marino.  She had presented to our facility on 2/27 with complaints of right facial numbness.  There was concern for possible stroke process.  MRI of the brain was done and showed a small focus of restricted diffusion in the region of the posterior limb of the left internal capsule. She was seen by Dr. Paiz.  She was started on aspirin and atorvastatin.  She tells me today that the numbness of her right face has almost completely  resolved.     The patient had also developed a rash during that admission and had some edema of her upper lip.  Her lisinopril was discontinued.  She was placed on a Medrol Dosepak at discharge.      Since going home, the rash is spread considerably.  She has had uncontrollable pruritus with it.  She has tried Benadryl in addition to the Medrol with little relief.  She perceives some slight difficulty with breathing yesterday.  No difficulty with swallowing.  No oral lesions.  No fevers, sweats, or chills.     She believes the rash started shortly after completing her MRI on 2/27.  The MRI was done without contrast.  She does not believe that she had taken any medications prior to the MRI.  Given that all this happened on her prior admission, I am having a hard time establishing a timeline on that.  The only medications that she received on her last admission were her regular home medications and aspirin and atorvastatin.  Lisinopril was discontinued.  She was not sent home on an alternative blood pressure medication.  She did have a CTA of head and neck, but claims the rash was already noticeable prior to receiving iodinated contrast.     She does admit that the rash has worsened since taking a daily aspirin.  She does not want to take aspirin any longer.  She had tolerated full dose aspirin is remotely.  She does take Toradol sometimes for painful menstrual cycles.  She has not had problems with nonsteroidals in the past.     Dr. Mobley felt that she should be admitted for this.     Subsequently:   She did very well overnight.  Her rash and pruritus are much improved on Decadron, Pepcid, and Atarax.  She wants to go home today.  She will taper off Decadron and continue to take Pepcid and Atarax.    I transitioned her aspirin to Plavix.  She believes that the rash started before her first aspirin administration, however, the rash had escalated and worsened with subsequently taking aspirin.  I highly doubt that  "lisinopril would cause a rash of this nature. It sounds like she may have had a bit of angioedema while she was hospitalized recently.  However, she had taken lisinopril for quite some time.  This was stopped.  An alternative blood pressure medication was not selected as the patient was normotensive through the rest of her stay.  Her blood pressure has been excellent here.  I agree that she can remain off an antihypertensive for now and self monitor as an outpatient.  She will also be obtaining reliable primary care.     I reviewed her recent stroke workup.  She has small to moderate right to left shunt and probable PFO on echocardiogram. She had a duplex of her bilateral lower extremities that failed to show any thrombus.  She had a CTA of the head and neck with and without contrast on 2/27 that showed no significant abnormalities.  Her hemoglobin A1c was normal at 5.3.  Lipid panel showed an LDL cholesterol of 172 and she was started on atorvastatin.  EKG was sinus rhythm and telemetry was also noted to be sinus rhythm.    She will obtain a primary care physician and follow-up as soon as possible.  She and her  moved here from [place] recently and she has not had a chance to set up a primary care physician.  It was recommended that she see neurology in 2 weeks at the time of her last discharge.  That appointment was ultimately made for 4/13.    Physical Exam on Discharge:  /70 (BP Location: Left arm, Patient Position: Lying)  Pulse 64  Temp 98.1 °F (36.7 °C) (Temporal Artery )   Resp 18  Ht 160 cm (63\")  Wt 78.9 kg (174 lb)  LMP 02/06/2018  SpO2 98%  BMI 30.82 kg/m2  Physical Exam  Constitutional: She is oriented to person, place, and time. She appears well-developed and well-nourished.   Up on the side of bed.  No distress.  Nontoxic.  Looks well.  Discussed with her nurse, Marina. Her  is present.   Head: Normocephalic and atraumatic.   Eyes: EOM are normal. Pupils are equal, round, " and reactive to light.   Cardiovascular: Normal rate and regular rhythm.    Pulmonary/Chest: Effort normal and breath sounds normal.   Musculoskeletal: Normal range of motion. She exhibits no edema.   Neurological: She is alert and oriented to person, place, and time. She displays normal reflexes. No cranial nerve deficit. She exhibits normal muscle tone.   Skin: Skin is warm and dry. Rash (Maculopapular, diffuse, most noticeable on the trunk and legs.  Spares the palms and soles.  Blanches.  No targetoid lesions.  No oral or posterior pharynx lesions.) MUCH IMPROVED.     Psychiatric: She has a normal mood and affect. Her behavior is normal. Judgment and thought content normal.     Condition on Discharge: Good.     Discharge Disposition:  Home or Self Care    Discharge Medications:   Judit Mata   Home Medication Instructions NITHIN:[id number]    Printed on:03/03/18 2247   Medication Information                      atorvastatin (LIPITOR) 80 MG tablet  Take 1 tablet by mouth Every Night.             calcium carbonate (TUMS) 500 MG chewable tablet  Chew 1 tablet 2 (Two) Times a Day As Needed for Indigestion or Heartburn.             clopidogrel (PLAVIX) 75 MG tablet  Take 1 tablet by mouth Daily.             dexamethasone (DECADRON) 2 MG tablet  Take 2 tablets 2 times daily for 2 days, then take 1 tablet 2 times daily for 3 days, then 1 tablet daily for 3 days, then stop.             famotidine (PEPCID) 20 MG tablet  Take 1 tablet by mouth 2 (Two) Times a Day for 5 days. Then as needed.             FLUoxetine (PROzac) 10 MG capsule  Take 10 mg by mouth Take As Directed. Only takes during menstrual cycle             folic acid-pyridoxine-cyanocobalamin (FOLBIC) 2.5-25-2 MG tablet tablet  Take 1 tablet by mouth Daily.             hydrOXYzine (ATARAX) 25 MG tablet  Take 1 tablet by mouth Every 6 (Six) Hours As Needed for Itching.             ketorolac (TORADOL) 10 MG tablet  Take 10 mg by mouth Every 6 (Six) Hours As  Needed for Moderate Pain  (takes monthly during menstrual cycle).               Discharge Diet:   Diet Instructions     Diet: Specialty Diet; Thin Liquids, No Restrictions; Low Fat       Discharge Diet:  Specialty Diet   Fluid Consistency:  Thin Liquids, No Restrictions   Specialty Diets:  Low Fat               Activity at Discharge:   Activity Instructions     Activity as Tolerated                   Follow-up Appointments:   Obtain PCP and follow in 1 week.     Future Appointments  Date Time Provider Department Center   4/13/2018 9:00 AM KENDELL Jarquin [facility] None     Test Results Pending at Discharge: None.     Michael Benavidez DO  03/03/18  11:16 AM    Time: 25 minutes.

## 2018-03-05 LAB
F5 GENE MUT ANL BLD/T: NORMAL
FACTOR V COMMENT: NORMAL

## 2018-04-13 ENCOUNTER — OFFICE VISIT (OUTPATIENT)
Dept: NEUROLOGY | Facility: CLINIC | Age: 48
End: 2018-04-13

## 2018-04-13 VITALS
HEIGHT: 63 IN | WEIGHT: 173 LBS | DIASTOLIC BLOOD PRESSURE: 72 MMHG | SYSTOLIC BLOOD PRESSURE: 124 MMHG | HEART RATE: 72 BPM | BODY MASS INDEX: 30.65 KG/M2

## 2018-04-13 DIAGNOSIS — R76.8 POSITIVE SM/RNP ANTIBODY: ICD-10-CM

## 2018-04-13 DIAGNOSIS — E78.5 DYSLIPIDEMIA: ICD-10-CM

## 2018-04-13 DIAGNOSIS — I10 HYPERTENSION, UNSPECIFIED TYPE: ICD-10-CM

## 2018-04-13 DIAGNOSIS — I63.312 CEREBROVASCULAR ACCIDENT (CVA) DUE TO THROMBOSIS OF LEFT MIDDLE CEREBRAL ARTERY (HCC): Primary | ICD-10-CM

## 2018-04-13 PROCEDURE — 99214 OFFICE O/P EST MOD 30 MIN: CPT | Performed by: CLINICAL NURSE SPECIALIST

## 2018-04-13 RX ORDER — CARVEDILOL 6.25 MG/1
6.25 TABLET ORAL 2 TIMES DAILY WITH MEALS
Status: ON HOLD | COMMUNITY
Start: 2018-03-29 | End: 2019-08-12

## 2018-04-13 RX ORDER — CLOPIDOGREL BISULFATE 75 MG/1
75 TABLET ORAL DAILY
COMMUNITY
End: 2018-06-22 | Stop reason: SDUPTHER

## 2018-04-13 RX ORDER — OMEPRAZOLE 20 MG/1
20 CAPSULE, DELAYED RELEASE ORAL DAILY
COMMUNITY
End: 2020-07-14

## 2018-04-13 NOTE — PATIENT INSTRUCTIONS
"DASH Eating Plan  DASH stands for \"Dietary Approaches to Stop Hypertension.\" The DASH eating plan is a healthy eating plan that has been shown to reduce high blood pressure (hypertension). It may also reduce your risk for type 2 diabetes, heart disease, and stroke. The DASH eating plan may also help with weight loss.  What are tips for following this plan?  General guidelines   · Avoid eating more than 2,300 mg (milligrams) of salt (sodium) a day. If you have hypertension, you may need to reduce your sodium intake to 1,500 mg a day.  · Limit alcohol intake to no more than 1 drink a day for nonpregnant women and 2 drinks a day for men. One drink equals 12 oz of beer, 5 oz of wine, or 1½ oz of hard liquor.  · Work with your health care provider to maintain a healthy body weight or to lose weight. Ask what an ideal weight is for you.  · Get at least 30 minutes of exercise that causes your heart to beat faster (aerobic exercise) most days of the week. Activities may include walking, swimming, or biking.  · Work with your health care provider or diet and nutrition specialist (dietitian) to adjust your eating plan to your individual calorie needs.  Reading food labels   · Check food labels for the amount of sodium per serving. Choose foods with less than 5 percent of the Daily Value of sodium. Generally, foods with less than 300 mg of sodium per serving fit into this eating plan.  · To find whole grains, look for the word \"whole\" as the first word in the ingredient list.  Shopping   · Buy products labeled as \"low-sodium\" or \"no salt added.\"  · Buy fresh foods. Avoid canned foods and premade or frozen meals.  Cooking   · Avoid adding salt when cooking. Use salt-free seasonings or herbs instead of table salt or sea salt. Check with your health care provider or pharmacist before using salt substitutes.  · Do not salomon foods. Cook foods using healthy methods such as baking, boiling, grilling, and broiling instead.  · Cook with " heart-healthy oils, such as olive, canola, soybean, or sunflower oil.  Meal planning     · Eat a balanced diet that includes:  ¨ 5 or more servings of fruits and vegetables each day. At each meal, try to fill half of your plate with fruits and vegetables.  ¨ Up to 6-8 servings of whole grains each day.  ¨ Less than 6 oz of lean meat, poultry, or fish each day. A 3-oz serving of meat is about the same size as a deck of cards. One egg equals 1 oz.  ¨ 2 servings of low-fat dairy each day.  ¨ A serving of nuts, seeds, or beans 5 times each week.  ¨ Heart-healthy fats. Healthy fats called Omega-3 fatty acids are found in foods such as flaxseeds and coldwater fish, like sardines, salmon, and mackerel.  · Limit how much you eat of the following:  ¨ Canned or prepackaged foods.  ¨ Food that is high in trans fat, such as fried foods.  ¨ Food that is high in saturated fat, such as fatty meat.  ¨ Sweets, desserts, sugary drinks, and other foods with added sugar.  ¨ Full-fat dairy products.  · Do not salt foods before eating.  · Try to eat at least 2 vegetarian meals each week.  · Eat more home-cooked food and less restaurant, buffet, and fast food.  · When eating at a restaurant, ask that your food be prepared with less salt or no salt, if possible.  What foods are recommended?  The items listed may not be a complete list. Talk with your dietitian about what dietary choices are best for you.  Grains   Whole-grain or whole-wheat bread. Whole-grain or whole-wheat pasta. Brown rice. Oatmeal. Quinoa. Bulgur. Whole-grain and low-sodium cereals. Annabelle bread. Low-fat, low-sodium crackers. Whole-wheat flour tortillas.  Vegetables   Fresh or frozen vegetables (raw, steamed, roasted, or grilled). Low-sodium or reduced-sodium tomato and vegetable juice. Low-sodium or reduced-sodium tomato sauce and tomato paste. Low-sodium or reduced-sodium canned vegetables.  Fruits   All fresh, dried, or frozen fruit. Canned fruit in natural juice  (without added sugar).  Meat and other protein foods   Skinless chicken or turkey. Ground chicken or turkey. Pork with fat trimmed off. Fish and seafood. Egg whites. Dried beans, peas, or lentils. Unsalted nuts, nut butters, and seeds. Unsalted canned beans. Lean cuts of beef with fat trimmed off. Low-sodium, lean deli meat.  Dairy   Low-fat (1%) or fat-free (skim) milk. Fat-free, low-fat, or reduced-fat cheeses. Nonfat, low-sodium ricotta or cottage cheese. Low-fat or nonfat yogurt. Low-fat, low-sodium cheese.  Fats and oils   Soft margarine without trans fats. Vegetable oil. Low-fat, reduced-fat, or light mayonnaise and salad dressings (reduced-sodium). Canola, safflower, olive, soybean, and sunflower oils. Avocado.  Seasoning and other foods   Herbs. Spices. Seasoning mixes without salt. Unsalted popcorn and pretzels. Fat-free sweets.  What foods are not recommended?  The items listed may not be a complete list. Talk with your dietitian about what dietary choices are best for you.  Grains   Baked goods made with fat, such as croissants, muffins, or some breads. Dry pasta or rice meal packs.  Vegetables   Creamed or fried vegetables. Vegetables in a cheese sauce. Regular canned vegetables (not low-sodium or reduced-sodium). Regular canned tomato sauce and paste (not low-sodium or reduced-sodium). Regular tomato and vegetable juice (not low-sodium or reduced-sodium). Pickles. Olives.  Fruits   Canned fruit in a light or heavy syrup. Fried fruit. Fruit in cream or butter sauce.  Meat and other protein foods   Fatty cuts of meat. Ribs. Fried meat. Cox. Sausage. Bologna and other processed lunch meats. Salami. Fatback. Hotdogs. Bratwurst. Salted nuts and seeds. Canned beans with added salt. Canned or smoked fish. Whole eggs or egg yolks. Chicken or turkey with skin.  Dairy   Whole or 2% milk, cream, and half-and-half. Whole or full-fat cream cheese. Whole-fat or sweetened yogurt. Full-fat cheese. Nondairy creamers.  Whipped toppings. Processed cheese and cheese spreads.  Fats and oils   Butter. Stick margarine. Lard. Shortening. Ghee. Cox fat. Tropical oils, such as coconut, palm kernel, or palm oil.  Seasoning and other foods   Salted popcorn and pretzels. Onion salt, garlic salt, seasoned salt, table salt, and sea salt. Worcestershire sauce. Tartar sauce. Barbecue sauce. Teriyaki sauce. Soy sauce, including reduced-sodium. Steak sauce. Canned and packaged gravies. Fish sauce. Oyster sauce. Cocktail sauce. Horseradish that you find on the shelf. Ketchup. Mustard. Meat flavorings and tenderizers. Bouillon cubes. Hot sauce and Tabasco sauce. Premade or packaged marinades. Premade or packaged taco seasonings. Relishes. Regular salad dressings.  Where to find more information:  · National Heart, Lung, and Blood [place]: www.nhlbi.nih.gov  · American Heart Association: www.heart.org  Summary  · The DASH eating plan is a healthy eating plan that has been shown to reduce high blood pressure (hypertension). It may also reduce your risk for type 2 diabetes, heart disease, and stroke.  · With the DASH eating plan, you should limit salt (sodium) intake to 2,300 mg a day. If you have hypertension, you may need to reduce your sodium intake to 1,500 mg a day.  · When on the DASH eating plan, aim to eat more fresh fruits and vegetables, whole grains, lean proteins, low-fat dairy, and heart-healthy fats.  · Work with your health care provider or diet and nutrition specialist (dietitian) to adjust your eating plan to your individual calorie needs.  This information is not intended to replace advice given to you by your health care provider. Make sure you discuss any questions you have with your health care provider.  Document Released: 12/06/2012 Document Revised: 12/11/2017 Document Reviewed: 12/11/2017  [employer] Patient Education © 2017 [employer]

## 2018-04-13 NOTE — PROGRESS NOTES
Subjective     Chief Complaint   Patient presents with   • Stroke       Judit Mata is a 47 y.o. 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 She is here today for follow up for left hemispheric stroke 2/27/18. She is accompanied by her mother. She has made complete recovery and has no residual effect. She was started on ASA 81 mg and Lisinopril in the hospital an developed angio edema and hives. She was treated and improved but unfortunately hives/rash continued and worsened and she presented to [facility] ED for treatment. ASA was d/c and she is now taking Plavix and feels like causes light headedness. She was transitioned to norvasc for BP and this caused LE edema and constipation and PCP switched her to Coreg which she is tolerating.  hypercoag panel was pending at discharge and is negative. MARY does show elevated RNP antibody. 2DE did show PFO and no closure recommended at this time. LDL elevated at 172. Stroke work up otherwise unremarkable. Patient does not use tobacco. Family hx of MI in father at age 40 but he had history of tobacco use at that time. Patient denies myalgias with statin.     Stroke   This is a chronic (right facial numbness, right facial weakness) problem. The current episode started more than 1 month ago (2/27/18). The problem has been resolved. Pertinent negatives include no arthralgias, chest pain, coughing, fatigue, headaches, nausea, vomiting or weakness. Associated symptoms comments: 2/27/18: she noted some right facial pain and retroauricular pain  She took an antacid and went to bed. She believes she woke up fine but noted that when she brushed her teeth her right gums were not feeling right which was around 7:15 AM so onset of symptoms is actually not known as she may have awakened with this. She did not note the numbness  when she drank water earlier today but admits that she may not have paid attention to it..  She went to work and there symptoms progressed where she  noted the  right facial droop and facial numbness at 8:30 AM  She went to  Urgent Care who then sent her here.      Facial numbness progressed to involve the entire 3rd branch of trigeminal nerve and now in process of resolving to just the right perioral area    Patient has hx of migraine. Exacerbated by: HTN, dyslipidemia, father MI at age 40. Treatments tried: ASA(rash/edema), lisinopril (rash/edema) plavix. The treatment provided significant relief.        Current Outpatient Prescriptions   Medication Sig Dispense Refill   • atorvastatin (LIPITOR) 80 MG tablet Take 1 tablet by mouth Every Night. 30 tablet 0   • calcium carbonate (TUMS) 500 MG chewable tablet Chew 1 tablet 2 (Two) Times a Day As Needed for Indigestion or Heartburn.     • carvedilol (COREG) 6.25 MG tablet Take 6.25 mg by mouth 2 (Two) Times a Day With Meals.     • FLUoxetine (PROzac) 10 MG capsule Take 10 mg by mouth Take As Directed. Only takes during menstrual cycle     • folic acid-pyridoxine-cyanocobalamin (FOLBIC) 2.5-25-2 MG tablet tablet Take 1 tablet by mouth Daily. 30 each 0   • hydrOXYzine (ATARAX) 25 MG tablet Take 1 tablet by mouth Every 6 (Six) Hours As Needed for Itching. 20 tablet 1   • ketorolac (TORADOL) 10 MG tablet Take 10 mg by mouth Every 6 (Six) Hours As Needed for Moderate Pain  (takes monthly during menstrual cycle).     • LYSINE PO Take  by mouth Daily.     • Multiple Vitamin (MULTI-VITAMIN PO) Take  by mouth Daily.     • omeprazole (priLOSEC) 20 MG capsule Take 20 mg by mouth Daily.       No current facility-administered medications for this visit.        Past Medical History:   Diagnosis Date   • Hypertension        Past Surgical History:   Procedure Laterality Date   • TUBAL ABDOMINAL LIGATION         family history includes No Known Problems in her father and mother.    Social History   Substance Use Topics   • Smoking status: Never Smoker   • Smokeless tobacco: Never Used   • Alcohol use No       Review of Systems  "  Constitutional: Negative.  Negative for fatigue.   HENT: Negative.  Negative for postnasal drip and sinus pressure.    Eyes: Negative.  Negative for visual disturbance.   Respiratory: Negative.  Negative for apnea and cough.    Cardiovascular: Negative.  Negative for chest pain.   Gastrointestinal: Negative.  Negative for blood in stool, constipation, diarrhea, nausea and vomiting.   Endocrine: Negative.    Genitourinary: Negative.  Negative for dysuria and frequency.   Musculoskeletal: Negative for arthralgias and gait problem.   Skin: Negative.    Allergic/Immunologic: Negative.    Neurological: Negative.  Negative for dizziness, weakness and headaches.   Hematological: Negative.    Psychiatric/Behavioral: Negative.  Negative for agitation, confusion and hallucinations.   All other systems reviewed and are negative.      Objective     /72   Pulse 72   Ht 160 cm (63\")   Wt 78.5 kg (173 lb)   BMI 30.65 kg/m² , Body mass index is 30.65 kg/m².    Physical Exam   Constitutional: She is oriented to person, place, and time. Vital signs are normal. She appears well-developed and well-nourished. She is cooperative.   HENT:   Head: Normocephalic and atraumatic.   Right Ear: External ear normal.   Left Ear: External ear normal.   Nose: Nose normal.   Mouth/Throat: Oropharynx is clear and moist.   Eyes: Conjunctivae, EOM and lids are normal. Pupils are equal, round, and reactive to light. Right eye exhibits normal extraocular motion and no nystagmus. Left eye exhibits normal extraocular motion and no nystagmus. Right pupil is round and reactive. Left pupil is round and reactive. Pupils are equal.   Neck: Normal range of motion and full passive range of motion without pain. Neck supple. Carotid bruit is not present.   Cardiovascular: Normal rate, regular rhythm and normal heart sounds.    No murmur heard.  Pulmonary/Chest: Effort normal and breath sounds normal.   Abdominal: Soft. Bowel sounds are normal. "   Musculoskeletal: Normal range of motion.   Neurological: She is alert and oriented to person, place, and time. She has normal strength and normal reflexes. She displays no tremor. No cranial nerve deficit or sensory deficit. She displays a negative Romberg sign. Coordination and gait normal. GCS eye subscore is 4. GCS verbal subscore is 5. GCS motor subscore is 6.   Reflex Scores:       Tricep reflexes are 2+ on the right side and 2+ on the left side.       Bicep reflexes are 2+ on the right side and 2+ on the left side.       Brachioradialis reflexes are 2+ on the right side and 2+ on the left side.       Patellar reflexes are 2+ on the right side and 2+ on the left side.       Achilles reflexes are 2+ on the right side and 2+ on the left side.  Awake, alert, no aphasia, dysarthria    CN II:  Visual fields full.  Pupils equally reactive to light  CN III, IV, VI:  Extraocular Muscles full with no signs of nystagmus  CN V:  Facial sensory is symmetric with no asymetries.  CN VII:  Facial motor symmetric  CN VIII:  Gross hearing intact bilaterally  CN IX:  Palate elevates symmetrically  CN X:  Palate elevates symmetrically  CN XI:  Shoulder shrug symmetric  CN XII:  Tongue is midline on protrusion    Full and symmetric strength bilateral upper and lower extremities    NIHSs = 0   Skin: Skin is warm and dry.   Psychiatric: She has a normal mood and affect. Her speech is normal and behavior is normal. Cognition and memory are normal.   Nursing note and vitals reviewed.      Results for orders placed or performed during the hospital encounter of 03/02/18   Comprehensive Metabolic Panel   Result Value Ref Range    Glucose 88 70 - 100 mg/dL    BUN 20 5 - 21 mg/dL    Creatinine 0.84 0.50 - 1.40 mg/dL    Sodium 140 135 - 145 mmol/L    Potassium 4.2 3.5 - 5.3 mmol/L    Chloride 98 98 - 110 mmol/L    CO2 31.0 24.0 - 31.0 mmol/L    Calcium 8.9 8.4 - 10.4 mg/dL    Total Protein 6.2 (L) 6.3 - 8.7 g/dL    Albumin 3.60 3.50 -  5.00 g/dL    ALT (SGPT) 23 0 - 54 U/L    AST (SGOT) 24 7 - 45 U/L    Alkaline Phosphatase 40 24 - 120 U/L    Total Bilirubin 1.0 0.1 - 1.0 mg/dL    eGFR Non African Amer 72 >60 mL/min/1.73    Globulin 2.6 gm/dL    A/G Ratio 1.4 1.1 - 2.5 g/dL    BUN/Creatinine Ratio 23.8 7.0 - 25.0    Anion Gap 11.0 4.0 - 13.0 mmol/L   CBC Auto Differential   Result Value Ref Range    WBC 13.79 (H) 4.80 - 10.80 10*3/mm3    RBC 4.95 4.20 - 5.40 10*6/mm3    Hemoglobin 14.6 12.0 - 16.0 g/dL    Hematocrit 42.5 37.0 - 47.0 %    MCV 85.9 82.0 - 98.0 fL    MCH 29.5 28.0 - 32.0 pg    MCHC 34.4 33.0 - 36.0 g/dL    RDW 12.5 12.0 - 15.0 %    RDW-SD 39.0 (L) 40.0 - 54.0 fl    MPV 8.9 6.0 - 12.0 fL    Platelets 350 130 - 400 10*3/mm3    Neutrophil % 76.8 39.0 - 78.0 %    Lymphocyte % 18.6 15.0 - 45.0 %    Monocyte % 3.3 (L) 4.0 - 12.0 %    Eosinophil % 0.9 0.0 - 4.0 %    Basophil % 0.1 0.0 - 2.0 %    Immature Grans % 0.3 0.0 - 5.0 %    Neutrophils, Absolute 10.58 (H) 1.87 - 8.40 10*3/mm3    Lymphocytes, Absolute 2.57 0.72 - 4.86 10*3/mm3    Monocytes, Absolute 0.45 0.19 - 1.30 10*3/mm3    Eosinophils, Absolute 0.13 0.00 - 0.70 10*3/mm3    Basophils, Absolute 0.02 0.00 - 0.20 10*3/mm3    Immature Grans, Absolute 0.04 (H) 0.00 - 0.03 10*3/mm3    nRBC 0.0 0.0 - 0.0 /100 WBC   CBC (No Diff)   Result Value Ref Range    WBC 10.62 4.80 - 10.80 10*3/mm3    RBC 3.75 (L) 4.20 - 5.40 10*6/mm3    Hemoglobin 10.9 (L) 12.0 - 16.0 g/dL    Hematocrit 32.7 (L) 37.0 - 47.0 %    MCV 87.2 82.0 - 98.0 fL    MCH 29.1 28.0 - 32.0 pg    MCHC 33.3 33.0 - 36.0 g/dL    RDW 12.5 12.0 - 15.0 %    RDW-SD 39.8 (L) 40.0 - 54.0 fl    MPV 9.3 6.0 - 12.0 fL    Platelets 282 130 - 400 10*3/mm3   Comprehensive Metabolic Panel   Result Value Ref Range    Glucose 118 (H) 70 - 100 mg/dL    BUN 18 5 - 21 mg/dL    Creatinine 0.72 0.50 - 1.40 mg/dL    Sodium 138 135 - 145 mmol/L    Potassium 4.2 3.5 - 5.3 mmol/L    Chloride 104 98 - 110 mmol/L    CO2 29.0 24.0 - 31.0 mmol/L    Calcium  8.5 8.4 - 10.4 mg/dL    Total Protein 5.3 (L) 6.3 - 8.7 g/dL    Albumin 2.90 (L) 3.50 - 5.00 g/dL    ALT (SGPT) 21 0 - 54 U/L    AST (SGOT) 17 7 - 45 U/L    Alkaline Phosphatase 35 24 - 120 U/L    Total Bilirubin 0.3 0.1 - 1.0 mg/dL    eGFR Non African Amer 86 >60 mL/min/1.73    Globulin 2.4 gm/dL    A/G Ratio 1.2 1.1 - 2.5 g/dL    BUN/Creatinine Ratio 25.0 7.0 - 25.0    Anion Gap 5.0 4.0 - 13.0 mmol/L      2decho: Interpretation Summary   · Left ventricular systolic function is normal. Estimated EF = 70%.  · There is a small to moderate right to left shunt and a probable PFO     Ct head: IMPRESSION:  Impression:  Normal CT head. Negative for stroke, hemorrhage, or mass.    Mri brain: IMPRESSION:  1. There is a small focus of restricted diffusion in the region of the  posterior limb of the left internal capsule with an associated ADC  mapping study. FINDINGS are suggestive of a small focus of  nonhemorrhagic ischemic infarction.  2. Otherwise unremarkable nonenhanced MRI of the brain.     cta head: IMPRESSION:  Unremarkable intracranial CTA.      cta neck: IMPRESSION:  1.. The aortic arch and great vessels are unremarkable. No evidence of  focal stenosis or plaquing involving either carotid artery including the  carotid bifurcation with both distal ICAs widely patent to the level of  the skull base.  2. The right vertebral artery is diminutive in size but widely patent. I  suspect the majority of flow from this vessel ends in the right  posterior inferior cerebellar artery but with a rudimentary connection  to the left vertebral to form the basilar artery. The left vertebral  artery is also widely patent.  3. Incidental note is made of loss of normal cervical lordosis.  Degenerative disc disease is noted at the C5-C6 and C6-C7 level  narrowing of disc space height and spondylosis.      LE venous: IMPRESSION:  Impression: There is no evidence of deep venous thrombosis or  superficial thrombophlebitis of right or left  lower extremities.    ASSESSMENT/PLAN    Diagnoses and all orders for this visit:    Cerebrovascular accident (CVA) due to thrombosis of left middle cerebral artery    Dyslipidemia    Hypertension, unspecified type    Positive sm/RNP antibody  -     Ambulatory Referral to Rheumatology    Other orders  -     carvedilol (COREG) 6.25 MG tablet; Take 6.25 mg by mouth 2 (Two) Times a Day With Meals.  -     Multiple Vitamin (MULTI-VITAMIN PO); Take  by mouth Daily.  -     LYSINE PO; Take  by mouth Daily.  -     omeprazole (priLOSEC) 20 MG capsule; Take 20 mg by mouth Daily.      MEDICAL DECISION MAKIN. D/C asa 2/2 side effects/reaction: rash, edema  2. Continue with Plavix 75 mg in PM and counseled to contact office if light headedness continues  3. BP management per PCP for systolic less than 130. Patient has side effects/reaction with lisinopril and norvasc  4. Continue lipitor 80 mg daily for LDL goal less than 70  5. Refer to Dr. ABY Chavez for elevated RNP antibody. Uncertain of significance  6. Does not use tobacco  7. No closure for PFO at this time unless patient has another neurological event.  8. Patient's Body mass index is 30.65 kg/m². BMI is above normal parameters. Follow-up plan includes:  no follow-up required.  9. Patient is counseled on stroke signs and symptoms using FAST and Time Saved is Brain Saved.  10. Current outpatient and discharge medications have been reconciled for the patient.  KENDELL Jarquin             allergies and all known medications/prescriptions have been reviewed using resources available on this encounter.    Return in about 3 months (around 2018).        KENDELL Garcia

## 2018-06-22 ENCOUNTER — TELEPHONE (OUTPATIENT)
Dept: NEUROLOGY | Facility: CLINIC | Age: 48
End: 2018-06-22

## 2018-06-22 RX ORDER — CLOPIDOGREL BISULFATE 75 MG/1
75 TABLET ORAL DAILY
Qty: 30 TABLET | Refills: 11 | Status: SHIPPED | OUTPATIENT
Start: 2018-06-22 | End: 2020-07-14 | Stop reason: SDUPTHER

## 2018-06-22 NOTE — TELEPHONE ENCOUNTER
Judit said her PCP doesn't want to refill Plavix, she wanted to know if you would refill it for her.

## 2018-09-24 ENCOUNTER — TELEPHONE (OUTPATIENT)
Dept: NEUROLOGY | Facility: CLINIC | Age: 48
End: 2018-09-24

## 2019-06-24 ENCOUNTER — OFFICE VISIT (OUTPATIENT)
Dept: GASTROENTEROLOGY | Facility: CLINIC | Age: 49
End: 2019-06-24

## 2019-06-24 VITALS
OXYGEN SATURATION: 97 % | BODY MASS INDEX: 30.73 KG/M2 | WEIGHT: 167 LBS | SYSTOLIC BLOOD PRESSURE: 124 MMHG | TEMPERATURE: 98 F | HEIGHT: 62 IN | HEART RATE: 70 BPM | DIASTOLIC BLOOD PRESSURE: 70 MMHG

## 2019-06-24 DIAGNOSIS — Z79.01 ANTICOAGULATED: ICD-10-CM

## 2019-06-24 DIAGNOSIS — Z12.11 ENCOUNTER FOR SCREENING FOR MALIGNANT NEOPLASM OF COLON: Primary | ICD-10-CM

## 2019-06-24 PROCEDURE — S0285 CNSLT BEFORE SCREEN COLONOSC: HCPCS | Performed by: NURSE PRACTITIONER

## 2019-06-24 RX ORDER — NEBIVOLOL HYDROCHLORIDE 10 MG/1
10 TABLET ORAL DAILY
Refills: 3 | COMMUNITY
Start: 2019-06-12 | End: 2021-07-02 | Stop reason: ALTCHOICE

## 2019-06-24 RX ORDER — SODIUM PICOSULFATE, MAGNESIUM OXIDE, AND ANHYDROUS CITRIC ACID 10; 3.5; 12 MG/160ML; G/160ML; G/160ML
1 LIQUID ORAL TAKE AS DIRECTED
Qty: 320 ML | Refills: 0 | Status: CANCELLED | OUTPATIENT
Start: 2019-06-24

## 2019-06-24 NOTE — PROGRESS NOTES
Chief Complaint   Patient presents with   • Colonoscopy     screenning colon       PCP: Gwendolyn Wooten APRN  REFER: Gwendolyn Wooten APRN    Subjective     HPI    Judit Mata is a 48 y.o. female who presents to office for preventative maintenance.  There is not  a personal history of colon polyps.  There is not a history of colon cancer.  She does not have complaints of nausea/vomiting, change in bowels, weight loss, no BRBPR, no melena.  There is not a family history of colon cancer.  There is not a family history of colon polyps.  She last colonoscopy-no previous colonoscopy .  Bowels do move on regular basis with daily use of dietary fiber/applesauce.    Plavix due to CVA     Past Medical History:   Diagnosis Date   • Hypertension      Past Surgical History:   Procedure Laterality Date   •  SECTION     • TONSILLECTOMY     • TUBAL ABDOMINAL LIGATION       Outpatient Medications Marked as Taking for the 19 encounter (Office Visit) with Manuel Holbrook APRN   Medication Sig Dispense Refill   • atorvastatin (LIPITOR) 80 MG tablet Take 1 tablet by mouth Every Night. 30 tablet 0   • BYSTOLIC 10 MG tablet Take 10 mg by mouth Daily. FOR 30 DAYS  3   • calcium carbonate (TUMS) 500 MG chewable tablet Chew 1 tablet 2 (Two) Times a Day As Needed for Indigestion or Heartburn.     • clopidogrel (PLAVIX) 75 MG tablet Take 1 tablet by mouth Daily. 30 tablet 11   • FLUoxetine (PROzac) 10 MG capsule Take 10 mg by mouth Take As Directed. Only takes during menstrual cycle     • folic acid-pyridoxine-cyanocobalamin (FOLBIC) 2.5-25-2 MG tablet tablet Take 1 tablet by mouth Daily. 30 each 0   • ketorolac (TORADOL) 10 MG tablet Take 10 mg by mouth Every 6 (Six) Hours As Needed for Moderate Pain  (takes monthly during menstrual cycle).     • LYSINE PO Take  by mouth Daily.     • Multiple Vitamin (MULTI-VITAMIN PO) Take  by mouth Daily.     • omeprazole (priLOSEC) 20 MG capsule Take 20 mg by mouth Daily.        Allergies   Allergen Reactions   • Aspirin Hives, Swelling and Rash   • Lisinopril Swelling and Rash     Social History     Socioeconomic History   • Marital status:      Spouse name: Not on file   • Number of children: Not on file   • Years of education: Not on file   • Highest education level: Not on file   Tobacco Use   • Smoking status: Never Smoker   • Smokeless tobacco: Never Used   Substance and Sexual Activity   • Alcohol use: No   • Drug use: No   • Sexual activity: Yes     Partners: Male     Family History   Problem Relation Age of Onset   • No Known Problems Mother    • No Known Problems Father    • Stomach cancer Paternal Uncle    • Esophageal cancer Maternal Grandmother    • Colon cancer Neg Hx      Review of Systems   Constitutional: Negative for fatigue, fever and unexpected weight change.   HENT: Negative for hearing loss, sore throat and voice change.    Eyes: Negative for visual disturbance.   Respiratory: Negative for cough, shortness of breath and wheezing.    Cardiovascular: Negative for chest pain and palpitations.   Gastrointestinal: Negative for abdominal pain, blood in stool and vomiting.   Endocrine: Negative for polydipsia and polyuria.   Genitourinary: Negative for difficulty urinating, dysuria, hematuria and urgency.   Musculoskeletal: Negative for joint swelling and myalgias.   Skin: Negative for color change, rash and wound.   Neurological: Negative for dizziness, tremors, seizures and syncope.   Hematological: Does not bruise/bleed easily.   Psychiatric/Behavioral: Negative for agitation and confusion. The patient is not nervous/anxious.      Objective   Vitals:    06/24/19 1031   BP: 124/70   Pulse: 70   Temp: 98 °F (36.7 °C)   SpO2: 97%     Physical Exam   Constitutional: She is oriented to person, place, and time. She appears well-developed and well-nourished. She is cooperative.   HENT:   Head: Normocephalic and atraumatic.   Eyes: Conjunctivae are normal. Pupils are  equal, round, and reactive to light. No scleral icterus.   Neck: Normal range of motion. Neck supple. No JVD present. No thyroid mass and no thyromegaly present.   Cardiovascular: Normal rate, regular rhythm and normal heart sounds. Exam reveals no gallop and no friction rub.   No murmur heard.  Pulmonary/Chest: Effort normal and breath sounds normal. No accessory muscle usage. No respiratory distress. She has no wheezes. She has no rales.   Abdominal: Soft. Normal appearance and bowel sounds are normal. She exhibits no distension, no ascites and no mass. There is no hepatosplenomegaly. There is no tenderness. There is no rebound and no guarding.   Musculoskeletal: Normal range of motion. She exhibits no edema or tenderness.     Vascular Status -  Her right foot exhibits normal foot vasculature  and no edema. Her left foot exhibits normal foot vasculature  and no edema.  Lymphadenopathy:     She has no cervical adenopathy.   Neurological: She is alert and oriented to person, place, and time. She has normal strength. Gait normal.   Skin: Skin is warm, dry and intact. No rash noted.     Imaging Results (most recent)     None        Body mass index is 30.54 kg/m².    Assessment/Plan   Judit was seen today for colonoscopy.    Diagnoses and all orders for this visit:    Encounter for screening for malignant neoplasm of colon  -     Case Request; Standing  -     Implement Anesthesia Orders Day of Procedure; Standing  -     Obtain Informed Consent; Standing  -     Case Request    Anticoagulated  Comments:  plavix will need to be help prior to procedure, request sent to referring physician  anticoagulated due to CVA    Other orders  -     Cancel: CLENPIQ 10-3.5-12 MG-GM -GM/160ML solution; Take 1 each by mouth Take As Directed.      COLONOSCOPY WITH ANESTHESIA (N/A)   miralax prep    Patient is to continue all blood pressure and cardiac medications prior to procedure and has been advised to take medications morning of  procedure   Pt verbalized understanding    Advised pt to stop ASA, use of NSAIDs, Fish Oil, and MV 5 days prior to procedure, per Dr Theodore protocol.  Tylenol based products are ok to take.  Pt verbalized understanding.    Patient is to hold their anticoagulation medication per the direction of their prescribing physician, KENDELL Batista. This is to prevent any risk or complication from bleeding intra and post procedure. If they develop bleeding post procedure they are to go the emergency department for further evaluation and treatment immediately    All risks, benefits, alternatives, and indications of colonoscopy procedure have been discussed with the patient. Risks to include perforation of the colon requiring possible surgery or colostomy, risk of bleeding from biopsies or removal of colon tissue, possibility of missing a colon polyp or cancer, or adverse drug reaction.  Benefits to include the diagnosis and management of disease of the colon and rectum. Alternatives to include barium enema, radiographic evaluation, lab testing or no intervention. She verbalizes understanding and agrees.     Patient's Body mass index is 30.54 kg/m². BMI is above normal parameters. Recommendations include: no follow up.      There are no Patient Instructions on file for this visit.

## 2019-06-26 ENCOUNTER — TELEPHONE (OUTPATIENT)
Dept: GASTROENTEROLOGY | Facility: CLINIC | Age: 49
End: 2019-06-26

## 2019-06-26 NOTE — TELEPHONE ENCOUNTER
----- Message from KENDELL Wright sent at 6/26/2019  9:21 AM CDT -----  Thank you    ----- Message -----  From: Michelle Barnard APRN  Sent: 6/26/2019   9:10 AM  To: KENDELL Wright    I received your letter requesting patient hold Plavix for colonoscopy. I have not seen this patient since 4/2018 and she did not keep her follow up appointment. She will need to be seen prior to me approving Plavix to be held. This may delay her colonoscopy. I will try to get her in to be seen as soon as able.    Thanks  padmaja

## 2019-06-26 NOTE — TELEPHONE ENCOUNTER
Spoke with patient - instructed her to contact office and get apt with Michelle Barnard office RE: Plavix     Explained to patient we may have to reschedule colonoscopy

## 2019-06-27 ENCOUNTER — TELEPHONE (OUTPATIENT)
Dept: GASTROENTEROLOGY | Facility: CLINIC | Age: 49
End: 2019-06-27

## 2019-06-27 NOTE — TELEPHONE ENCOUNTER
Needs to reschedule colonoscopy after appointment with KENDELL Batista - Patient has an appointment on 07/09/2019 for clearance. Needs to be able to hold plavix 5 days.     Waiting till after appt with them to schedule colonoscopy.

## 2019-07-09 ENCOUNTER — OFFICE VISIT (OUTPATIENT)
Dept: NEUROLOGY | Facility: CLINIC | Age: 49
End: 2019-07-09

## 2019-07-09 VITALS
BODY MASS INDEX: 29.63 KG/M2 | HEIGHT: 62 IN | OXYGEN SATURATION: 98 % | DIASTOLIC BLOOD PRESSURE: 62 MMHG | HEART RATE: 59 BPM | SYSTOLIC BLOOD PRESSURE: 110 MMHG | WEIGHT: 161 LBS

## 2019-07-09 DIAGNOSIS — I83.811 VARICOSE VEINS OF RIGHT LOWER EXTREMITY WITH PAIN: ICD-10-CM

## 2019-07-09 DIAGNOSIS — I63.312 CEREBROVASCULAR ACCIDENT (CVA) DUE TO THROMBOSIS OF LEFT MIDDLE CEREBRAL ARTERY (HCC): Primary | ICD-10-CM

## 2019-07-09 PROCEDURE — 99213 OFFICE O/P EST LOW 20 MIN: CPT | Performed by: CLINICAL NURSE SPECIALIST

## 2019-07-09 RX ORDER — ATORVASTATIN CALCIUM 40 MG/1
40 TABLET, FILM COATED ORAL
Refills: 3 | COMMUNITY
Start: 2019-04-05

## 2019-07-09 NOTE — PROGRESS NOTES
Subjective     Chief Complaint   Patient presents with   • Stroke     F/U has questions about Plavix         Judit Mata is a 47 y.o. 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 She is here today for follow up for left hemispheric stroke 2/27/18. She is accompanied by herself. She was last seen 4/2018. She is here to obtain clearance to hold Plavix for colonoscopy.  She has made complete recovery and has no residual effect. As you recall, she was started on ASA 81 mg and Lisinopril in the hospital an developed angio edema and hives. She was treated and improved but unfortunately hives/rash continued and worsened and she presented to [facility] ED for treatment. ASA was d/c and she is now taking Plavix 75 mg daily. She states she feels like she has had some hair loss over the past year. Per patient thyroid testing WNL. She is having low back pain and under workup with PCP. She does complain of varicose vein on lateral right thigh that is tender at times. She also has spider veins. She is requesting referral to vascular surgeon. She is now taking Bystolic for HTN. Previous  hypercoag panel showed elevated RNP antibody. She was referred to Dr. Oro who patient states did additional work up and  Is to see PRN. . 2DE did show PFO and no closure recommended at this time. LDL elevated at 172 and remains elevated. PCP had lowered statin as she was having joint pain. . Patient does not use tobacco. Family hx of MI in father at age 40 but he had history of tobacco use at that time.       Stroke   This is a chronic (right facial numbness, right facial weakness) problem. Episode onset: 2/27/18. The problem has been resolved. Pertinent negatives include no arthralgias, chest pain, coughing, fatigue, headaches, nausea, vomiting or weakness. Associated symptoms comments: 2/27/18: she noted some right facial pain and retroauricular pain  She took an antacid and went to bed. She believes she woke up fine but noted that when  she brushed her teeth her right gums were not feeling right which was around 7:15 AM so onset of symptoms is actually not known as she may have awakened with this. She did not note the numbness  when she drank water earlier today but admits that she may not have paid attention to it..  She went to work and there symptoms progressed where she  noted the right facial droop and facial numbness at 8:30 AM  She went to  Urgent Care who then sent her here.      Facial numbness progressed to involve the entire 3rd branch of trigeminal nerve and now in process of resolving to just the right perioral area    Patient has hx of migraine. Exacerbated by: HTN, dyslipidemia, father MI at age 40. Treatments tried: ASA(rash/edema), lisinopril (rash/edema) plavix. The treatment provided significant relief.        Current Outpatient Medications   Medication Sig Dispense Refill   • BYSTOLIC 10 MG tablet Take 10 mg by mouth Daily. FOR 30 DAYS  3   • calcium carbonate (TUMS) 500 MG chewable tablet Chew 1 tablet 2 (Two) Times a Day As Needed for Indigestion or Heartburn.     • carvedilol (COREG) 6.25 MG tablet Take 6.25 mg by mouth 2 (Two) Times a Day With Meals.     • clopidogrel (PLAVIX) 75 MG tablet Take 1 tablet by mouth Daily. 30 tablet 11   • FLUoxetine (PROzac) 10 MG capsule Take 10 mg by mouth Take As Directed. Only takes during menstrual cycle     • ketorolac (TORADOL) 10 MG tablet Take 10 mg by mouth Every 6 (Six) Hours As Needed for Moderate Pain  (takes monthly during menstrual cycle).     • LYSINE PO Take  by mouth Daily.     • Multiple Vitamin (MULTI-VITAMIN PO) Take  by mouth Daily.     • omeprazole (priLOSEC) 20 MG capsule Take 20 mg by mouth Daily.     • atorvastatin (LIPITOR) 40 MG tablet 40 mg.  3   • enoxaparin (LOVENOX) 30 MG/0.3ML solution syringe Inject 0.3 mL under the skin into the appropriate area as directed Daily for 5 doses. While off plavix 1.5 mL 0     No current facility-administered medications for this  "visit.        Past Medical History:   Diagnosis Date   • Hypertension        Past Surgical History:   Procedure Laterality Date   •  SECTION     • TONSILLECTOMY     • TUBAL ABDOMINAL LIGATION         family history includes Esophageal cancer in her maternal grandmother; No Known Problems in her father and mother; Stomach cancer in her paternal uncle.    Social History     Tobacco Use   • Smoking status: Never Smoker   • Smokeless tobacco: Never Used   Substance Use Topics   • Alcohol use: No   • Drug use: No       Review of Systems   Constitutional: Negative.  Negative for fatigue.   HENT: Negative.  Negative for postnasal drip and sinus pressure.    Eyes: Negative.  Negative for visual disturbance.   Respiratory: Negative.  Negative for apnea and cough.    Cardiovascular: Negative.  Negative for chest pain.   Gastrointestinal: Negative.  Negative for blood in stool, constipation, diarrhea, nausea and vomiting.   Endocrine: Negative.    Genitourinary: Negative.  Negative for dysuria and frequency.   Musculoskeletal: Positive for back pain (low back pain). Negative for arthralgias and gait problem.   Skin: Negative.    Allergic/Immunologic: Negative.    Neurological: Negative.  Negative for dizziness, weakness and headaches.   Hematological: Negative.    Psychiatric/Behavioral: Negative.  Negative for agitation, confusion and hallucinations.   All other systems reviewed and are negative.      Objective     /62 (BP Location: Right arm, Patient Position: Sitting)   Pulse 59   Ht 157.5 cm (62\")   Wt 73 kg (161 lb)   LMP 06/15/2019 (Approximate)   SpO2 98%   BMI 29.45 kg/m² , Body mass index is 29.45 kg/m².    Physical Exam   Constitutional: She is oriented to person, place, and time. Vital signs are normal. She appears well-developed and well-nourished. She is cooperative.   HENT:   Head: Normocephalic and atraumatic.   Right Ear: External ear normal.   Left Ear: External ear normal.   Nose: Nose " normal.   Mouth/Throat: Oropharynx is clear and moist.   Eyes: Conjunctivae, EOM and lids are normal. Pupils are equal, round, and reactive to light. Right eye exhibits normal extraocular motion and no nystagmus. Left eye exhibits normal extraocular motion and no nystagmus. Right pupil is round and reactive. Left pupil is round and reactive. Pupils are equal.   Neck: Normal range of motion and full passive range of motion without pain. Neck supple. Carotid bruit is not present.   Cardiovascular: Normal rate, regular rhythm and normal heart sounds.   No murmur heard.  Pulmonary/Chest: Effort normal and breath sounds normal.   Abdominal: Soft. Bowel sounds are normal.   Musculoskeletal: Normal range of motion.   Complaints of varicose vein lateral right thigh with tenderness. Spider veins present. States tender at times   Neurological: She is alert and oriented to person, place, and time. She has normal strength and normal reflexes. She displays no tremor. No cranial nerve deficit or sensory deficit. She displays a negative Romberg sign. Coordination and gait normal. GCS eye subscore is 4. GCS verbal subscore is 5. GCS motor subscore is 6.   Reflex Scores:       Tricep reflexes are 2+ on the right side and 2+ on the left side.       Bicep reflexes are 2+ on the right side and 2+ on the left side.       Brachioradialis reflexes are 2+ on the right side and 2+ on the left side.       Patellar reflexes are 2+ on the right side and 2+ on the left side.       Achilles reflexes are 2+ on the right side and 2+ on the left side.  Awake, alert, no aphasia, dysarthria    CN II:  Visual fields full.  Pupils equally reactive to light  CN III, IV, VI:  Extraocular Muscles full with no signs of nystagmus  CN V:  Facial sensory is symmetric with no asymetries.  CN VII:  Facial motor symmetric  CN VIII:  Gross hearing intact bilaterally  CN IX:  Palate elevates symmetrically  CN X:  Palate elevates symmetrically  CN XI:  Shoulder  shrug symmetric  CN XII:  Tongue is midline on protrusion    Full and symmetric strength bilateral upper and lower extremities    NIHSs = 0   Skin: Skin is warm and dry.   Psychiatric: She has a normal mood and affect. Her speech is normal and behavior is normal. Cognition and memory are normal.   Nursing note and vitals reviewed.      Results for orders placed or performed during the hospital encounter of 03/02/18   Comprehensive Metabolic Panel   Result Value Ref Range    Glucose 88 70 - 100 mg/dL    BUN 20 5 - 21 mg/dL    Creatinine 0.84 0.50 - 1.40 mg/dL    Sodium 140 135 - 145 mmol/L    Potassium 4.2 3.5 - 5.3 mmol/L    Chloride 98 98 - 110 mmol/L    CO2 31.0 24.0 - 31.0 mmol/L    Calcium 8.9 8.4 - 10.4 mg/dL    Total Protein 6.2 (L) 6.3 - 8.7 g/dL    Albumin 3.60 3.50 - 5.00 g/dL    ALT (SGPT) 23 0 - 54 U/L    AST (SGOT) 24 7 - 45 U/L    Alkaline Phosphatase 40 24 - 120 U/L    Total Bilirubin 1.0 0.1 - 1.0 mg/dL    eGFR Non African Amer 72 >60 mL/min/1.73    Globulin 2.6 gm/dL    A/G Ratio 1.4 1.1 - 2.5 g/dL    BUN/Creatinine Ratio 23.8 7.0 - 25.0    Anion Gap 11.0 4.0 - 13.0 mmol/L   CBC Auto Differential   Result Value Ref Range    WBC 13.79 (H) 4.80 - 10.80 10*3/mm3    RBC 4.95 4.20 - 5.40 10*6/mm3    Hemoglobin 14.6 12.0 - 16.0 g/dL    Hematocrit 42.5 37.0 - 47.0 %    MCV 85.9 82.0 - 98.0 fL    MCH 29.5 28.0 - 32.0 pg    MCHC 34.4 33.0 - 36.0 g/dL    RDW 12.5 12.0 - 15.0 %    RDW-SD 39.0 (L) 40.0 - 54.0 fl    MPV 8.9 6.0 - 12.0 fL    Platelets 350 130 - 400 10*3/mm3    Neutrophil % 76.8 39.0 - 78.0 %    Lymphocyte % 18.6 15.0 - 45.0 %    Monocyte % 3.3 (L) 4.0 - 12.0 %    Eosinophil % 0.9 0.0 - 4.0 %    Basophil % 0.1 0.0 - 2.0 %    Immature Grans % 0.3 0.0 - 5.0 %    Neutrophils, Absolute 10.58 (H) 1.87 - 8.40 10*3/mm3    Lymphocytes, Absolute 2.57 0.72 - 4.86 10*3/mm3    Monocytes, Absolute 0.45 0.19 - 1.30 10*3/mm3    Eosinophils, Absolute 0.13 0.00 - 0.70 10*3/mm3    Basophils, Absolute 0.02 0.00 -  0.20 10*3/mm3    Immature Grans, Absolute 0.04 (H) 0.00 - 0.03 10*3/mm3    nRBC 0.0 0.0 - 0.0 /100 WBC   CBC (No Diff)   Result Value Ref Range    WBC 10.62 4.80 - 10.80 10*3/mm3    RBC 3.75 (L) 4.20 - 5.40 10*6/mm3    Hemoglobin 10.9 (L) 12.0 - 16.0 g/dL    Hematocrit 32.7 (L) 37.0 - 47.0 %    MCV 87.2 82.0 - 98.0 fL    MCH 29.1 28.0 - 32.0 pg    MCHC 33.3 33.0 - 36.0 g/dL    RDW 12.5 12.0 - 15.0 %    RDW-SD 39.8 (L) 40.0 - 54.0 fl    MPV 9.3 6.0 - 12.0 fL    Platelets 282 130 - 400 10*3/mm3   Comprehensive Metabolic Panel   Result Value Ref Range    Glucose 118 (H) 70 - 100 mg/dL    BUN 18 5 - 21 mg/dL    Creatinine 0.72 0.50 - 1.40 mg/dL    Sodium 138 135 - 145 mmol/L    Potassium 4.2 3.5 - 5.3 mmol/L    Chloride 104 98 - 110 mmol/L    CO2 29.0 24.0 - 31.0 mmol/L    Calcium 8.5 8.4 - 10.4 mg/dL    Total Protein 5.3 (L) 6.3 - 8.7 g/dL    Albumin 2.90 (L) 3.50 - 5.00 g/dL    ALT (SGPT) 21 0 - 54 U/L    AST (SGOT) 17 7 - 45 U/L    Alkaline Phosphatase 35 24 - 120 U/L    Total Bilirubin 0.3 0.1 - 1.0 mg/dL    eGFR Non African Amer 86 >60 mL/min/1.73    Globulin 2.4 gm/dL    A/G Ratio 1.2 1.1 - 2.5 g/dL    BUN/Creatinine Ratio 25.0 7.0 - 25.0    Anion Gap 5.0 4.0 - 13.0 mmol/L        ASSESSMENT/PLAN    Diagnoses and all orders for this visit:    Cerebrovascular accident (CVA) due to thrombosis of left middle cerebral artery (CMS/HCC)    Varicose veins of right lower extremity with pain  -     Ambulatory Referral to Vascular Surgery    Other orders  -     atorvastatin (LIPITOR) 40 MG tablet; 40 mg.  -     enoxaparin (LOVENOX) 30 MG/0.3ML solution syringe; Inject 0.3 mL under the skin into the appropriate area as directed Daily for 5 doses. While off plavix    MEDICAL DECISION MAKIN. Ok to hold Plavix for 5 days for colonosopy. Will need Lovenox 30 mg SQ daily while off Plavix. Resume Plavix as soon as able.   2. Continue with Plavix 75 mg in PM.   3. BP management per PCP for systolic less than 140. Patient has  side effects/reaction with lisinopril and norvasc  4. Continue lipitor 40 mg daily for LDL goal less than 70  5. Refer to vascular surgery for evaluation of varicose vein and spider veins right lateral thigh  6. Does not use tobacco  7. No closure for PFO at this time unless patient has another neurological event.  8. Patient's Body mass index is 29.45 kg/m². BMI is above normal parameters. Recommendations include: educational material.  9. Patient is counseled on stroke signs and symptoms using FAST and Time Saved is Brain Saved.    HPI and ROS reviewed and updated.        allergies and all known medications/prescriptions have been reviewed using resources available on this encounter.    Return in about 1 year (around 7/9/2020).        Michelle Barnard, APRN

## 2019-07-10 ENCOUNTER — TELEPHONE (OUTPATIENT)
Dept: VASCULAR SURGERY | Facility: CLINIC | Age: 49
End: 2019-07-10

## 2019-07-11 ENCOUNTER — TELEPHONE (OUTPATIENT)
Dept: GASTROENTEROLOGY | Facility: CLINIC | Age: 49
End: 2019-07-11

## 2019-07-11 NOTE — TELEPHONE ENCOUNTER
Recd clearance for her to hold plavix    Reschedule colonoscopy for 08/12/2019 at 9:15am       Mailed new instructions

## 2019-08-06 ENCOUNTER — TELEPHONE (OUTPATIENT)
Dept: VASCULAR SURGERY | Facility: CLINIC | Age: 49
End: 2019-08-06

## 2019-08-12 ENCOUNTER — ANESTHESIA (OUTPATIENT)
Dept: GASTROENTEROLOGY | Facility: HOSPITAL | Age: 49
End: 2019-08-12

## 2019-08-12 ENCOUNTER — ANESTHESIA EVENT (OUTPATIENT)
Dept: GASTROENTEROLOGY | Facility: HOSPITAL | Age: 49
End: 2019-08-12

## 2019-08-12 ENCOUNTER — TELEPHONE (OUTPATIENT)
Dept: GASTROENTEROLOGY | Facility: CLINIC | Age: 49
End: 2019-08-12

## 2019-08-12 ENCOUNTER — HOSPITAL ENCOUNTER (OUTPATIENT)
Facility: HOSPITAL | Age: 49
Setting detail: HOSPITAL OUTPATIENT SURGERY
Discharge: HOME | End: 2019-08-12
Attending: INTERNAL MEDICINE | Admitting: INTERNAL MEDICINE

## 2019-08-12 VITALS
HEIGHT: 62 IN | SYSTOLIC BLOOD PRESSURE: 102 MMHG | DIASTOLIC BLOOD PRESSURE: 69 MMHG | WEIGHT: 162 LBS | TEMPERATURE: 98 F | OXYGEN SATURATION: 99 % | HEART RATE: 59 BPM | RESPIRATION RATE: 18 BRPM | BODY MASS INDEX: 29.81 KG/M2

## 2019-08-12 DIAGNOSIS — Z12.11 ENCOUNTER FOR SCREENING FOR MALIGNANT NEOPLASM OF COLON: ICD-10-CM

## 2019-08-12 LAB — B-HCG UR QL: NEGATIVE

## 2019-08-12 PROCEDURE — 25010000002 PROPOFOL 10 MG/ML EMULSION: Performed by: NURSE ANESTHETIST, CERTIFIED REGISTERED

## 2019-08-12 PROCEDURE — 81025 URINE PREGNANCY TEST: CPT | Performed by: ANESTHESIOLOGY

## 2019-08-12 PROCEDURE — G0105 COLORECTAL SCRN; HI RISK IND: HCPCS | Performed by: INTERNAL MEDICINE

## 2019-08-12 RX ORDER — LIDOCAINE HYDROCHLORIDE 20 MG/ML
INJECTION, SOLUTION INFILTRATION; PERINEURAL AS NEEDED
Status: DISCONTINUED | OUTPATIENT
Start: 2019-08-12 | End: 2019-08-12 | Stop reason: SURG

## 2019-08-12 RX ORDER — SODIUM CHLORIDE 9 MG/ML
500 INJECTION, SOLUTION INTRAVENOUS CONTINUOUS PRN
Status: DISCONTINUED | OUTPATIENT
Start: 2019-08-12 | End: 2019-08-12 | Stop reason: HOSPADM

## 2019-08-12 RX ORDER — PROPOFOL 10 MG/ML
VIAL (ML) INTRAVENOUS AS NEEDED
Status: DISCONTINUED | OUTPATIENT
Start: 2019-08-12 | End: 2019-08-12 | Stop reason: SURG

## 2019-08-12 RX ORDER — SODIUM CHLORIDE 0.9 % (FLUSH) 0.9 %
3 SYRINGE (ML) INJECTION AS NEEDED
Status: DISCONTINUED | OUTPATIENT
Start: 2019-08-12 | End: 2019-08-12 | Stop reason: HOSPADM

## 2019-08-12 RX ADMIN — LIDOCAINE HYDROCHLORIDE 100 MG: 20 INJECTION, SOLUTION INFILTRATION; PERINEURAL at 10:38

## 2019-08-12 RX ADMIN — SODIUM CHLORIDE 500 ML: 9 INJECTION, SOLUTION INTRAVENOUS at 10:12

## 2019-08-12 RX ADMIN — PROPOFOL 200 MG: 10 INJECTION, EMULSION INTRAVENOUS at 10:38

## 2019-08-12 NOTE — H&P
[facility]  Pre Procedure History & Physical    Chief Complaint:   fhx colon polyps    Subjective     HPI:   Fhx colon Polyps    Past Medical History:   Past Medical History:   Diagnosis Date   • GERD (gastroesophageal reflux disease)    • Hypertension    • Melanoma (CMS/HCC)    • Stroke (CMS/HCC)        Past Surgical History:  Past Surgical History:   Procedure Laterality Date   •  SECTION     • TONSILLECTOMY     • TUBAL ABDOMINAL LIGATION         Family History:  Family History   Problem Relation Age of Onset   • No Known Problems Mother    • No Known Problems Father    • Stomach cancer Paternal Uncle    • Esophageal cancer Maternal Grandmother    • Colon cancer Neg Hx        Social History:   reports that she has never smoked. She has never used smokeless tobacco. She reports that she does not drink alcohol or use drugs.    Medications:   Prior to Admission medications    Medication Sig Start Date End Date Taking? Authorizing Provider   atorvastatin (LIPITOR) 40 MG tablet 40 mg. 19  Yes Saji Lee MD   BYSTOLIC 10 MG tablet Take 10 mg by mouth Daily. FOR 30 DAYS 19  Yes Saji Lee MD   calcium carbonate (TUMS) 500 MG chewable tablet Chew 1 tablet 2 (Two) Times a Day As Needed for Indigestion or Heartburn.   Yes Saji Lee MD   FLUoxetine (PROzac) 10 MG capsule Take 10 mg by mouth Take As Directed. Only takes during menstrual cycle   Yes Saji Lee MD   ketorolac (TORADOL) 10 MG tablet Take 10 mg by mouth Every 6 (Six) Hours As Needed for Moderate Pain  (takes monthly during menstrual cycle).   Yes Saji Lee MD   Multiple Vitamin (MULTI-VITAMIN PO) Take  by mouth Daily.   Yes Saji Lee MD   omeprazole (priLOSEC) 20 MG capsule Take 20 mg by mouth Daily.   Yes Saji Lee MD   clopidogrel (PLAVIX) 75 MG tablet Take 1 tablet by mouth Daily. 18   Michelle Barnard APRN   LYSINE PO Take  by mouth  "Daily.    Provider, MD Saji   carvedilol (COREG) 6.25 MG tablet Take 6.25 mg by mouth 2 (Two) Times a Day With Meals. 3/29/18 8/12/19  Provider, MD Saji       Allergies:  Aspirin and Lisinopril    ROS:    General: Weight stable  Resp: No SOA  Cardiovascular: No CP    Objective     Blood pressure 106/72, pulse 53, temperature 98 °F (36.7 °C), temperature source Temporal, resp. rate 18, height 157.5 cm (62\"), weight 73.5 kg (162 lb), SpO2 98 %, not currently breastfeeding.    Physical Exam   Constitutional: Pt is oriented to person, place, and in no distress.   HENT: Mouth/Throat: Oropharynx is clear.   Cardiovascular: Normal rate, regular rhythm.    Pulmonary/Chest: Effort normal. No respiratory distress. No  wheezes.   Abdominal: Soft. Non-distended.  Skin: Skin is warm and dry.   Psychiatric: Mood, memory, affect and judgment appear normal.     Assessment/Plan     Diagnosis:  Fhx colon Polyps    Anticipated Surgical Procedure:  C-scope    The risks, benefits, and alternatives of this procedure have been discussed with the patient or the responsible party- the patient understands and agrees to proceed.        "

## 2019-08-12 NOTE — ANESTHESIA POSTPROCEDURE EVALUATION
"Patient: Judit Mata    Procedure Summary     Date:  08/12/19 Room / Location:   [facility] /  [facility]    Anesthesia Start:  1031 Anesthesia Stop:  1052    Procedure:  COLONOSCOPY WITH ANESTHESIA (N/A ) Diagnosis:       Encounter for screening for malignant neoplasm of colon      (Encounter for screening for malignant neoplasm of colon [Z12.11])    Surgeon:  Adal Theodore DO Provider:  Bret Mccarthy CRNA    Anesthesia Type:  MAC ASA Status:  3          Anesthesia Type: MAC  Last vitals  BP   106/72 (08/12/19 0941)   Temp   98 °F (36.7 °C) (08/12/19 0941)   Pulse   53 (08/12/19 0941)   Resp   18 (08/12/19 0941)     SpO2   98 % (08/12/19 0941)     Post Anesthesia Care and Evaluation    Patient location during evaluation: PHASE II  Patient participation: complete - patient participated  Level of consciousness: awake and alert  Pain management: adequate  Airway patency: patent  Anesthetic complications: No anesthetic complications    Cardiovascular status: acceptable  Respiratory status: acceptable  Hydration status: acceptable    Comments: Blood pressure 106/72, pulse 53, temperature 98 °F (36.7 °C), temperature source Temporal, resp. rate 18, height 157.5 cm (62\"), weight 73.5 kg (162 lb), SpO2 98 %, not currently breastfeeding.    Pt discharged from PACU based on viktor score >8      "

## 2019-08-12 NOTE — ANESTHESIA PREPROCEDURE EVALUATION
Anesthesia Evaluation     Patient summary reviewed   no history of anesthetic complications:  NPO Solid Status: > 8 hours  NPO Liquid Status: > 4 hours           Airway   Mallampati: I  TM distance: >3 FB  Neck ROM: full  No difficulty expected  Dental - normal exam     Pulmonary - negative pulmonary ROS   Cardiovascular     PT is on anticoagulation therapy  Patient on routine beta blocker and Beta blocker given within 24 hours of surgery    (+) hypertension,   (-) past MI, CAD, cardiac stents    ROS comment: PFO    plavix -->lovenox bridge    Neuro/Psych  (+) CVA (1.5 years ago),     GI/Hepatic/Renal/Endo    (+)  GERD,    (-) liver disease, no renal disease, diabetes    Musculoskeletal     (+) back pain,   Abdominal    Substance History      OB/GYN          Other                        Anesthesia Plan    ASA 3     MAC     intravenous induction   Anesthetic plan, all risks, benefits, and alternatives have been provided, discussed and informed consent has been obtained with: patient.

## 2019-08-22 ENCOUNTER — OFFICE VISIT (OUTPATIENT)
Dept: VASCULAR SURGERY | Facility: CLINIC | Age: 49
End: 2019-08-22

## 2019-08-22 VITALS
WEIGHT: 166 LBS | SYSTOLIC BLOOD PRESSURE: 110 MMHG | HEART RATE: 54 BPM | BODY MASS INDEX: 30.55 KG/M2 | OXYGEN SATURATION: 98 % | DIASTOLIC BLOOD PRESSURE: 78 MMHG | HEIGHT: 62 IN

## 2019-08-22 DIAGNOSIS — M79.604 LEG PAIN, LATERAL, RIGHT: ICD-10-CM

## 2019-08-22 DIAGNOSIS — I87.321 CHRONIC VENOUS HYPERTENSION WITH INFLAMMATION INVOLVING RIGHT SIDE: Primary | ICD-10-CM

## 2019-08-22 PROCEDURE — 99204 OFFICE O/P NEW MOD 45 MIN: CPT | Performed by: SURGERY

## 2019-08-22 NOTE — PROGRESS NOTES
2019      Michelle Barnard, APRN  [address]  [address]  [place] [address]    Judit Mata  1970    Chief Complaint   Patient presents with   • Establish Care     Varicose veins of the RLE with pain.  Pt states that it is painful no matter what she is doing.  Pt states that this has been going on over a year.  Pt denies wearing any compression socks/hose. Pt has hx of stroke.  Stephany Barnard referring.        Dear KENDELL Jarquin    HPI  I had the pleasure of seeing your patient Judit Mata in the office today.  Thank you kindly for this consultation.  As you recall, Judit Mata is a 49 y.o.  female who you are currently following for stroke.  She has a previous left hemispheric stroke in 2018.  She reports pain to right outer thigh in one area.  She does have varicose veins to bilateral lower extremities.  She is maintained on Plavix and Lipitor.  She reports being seen by general surgery regarding the pain to her right lateral thigh.  She denies any previous history of DVT or phlebitis but does have a patent foramen ovale.  She also denies any use of compression stockings in the past.  She does have some heaviness, tiredness, and cramps at night when she sleeps.      Past Medical History:   Diagnosis Date   • GERD (gastroesophageal reflux disease)    • Hypertension    • Melanoma (CMS/HCC)    • Stroke (CMS/HCC)        Past Surgical History:   Procedure Laterality Date   •  SECTION     • COLONOSCOPY N/A 2019    Procedure: COLONOSCOPY WITH ANESTHESIA;  Surgeon: Adal Theodore DO;  Location: [facility] ENDOSCOPY;  Service: Gastroenterology   • TONSILLECTOMY     • TUBAL ABDOMINAL LIGATION         Family History   Problem Relation Age of Onset   • No Known Problems Mother    • No Known Problems Father    • Stomach cancer Paternal Uncle    • Esophageal cancer Maternal Grandmother    • Colon cancer Neg Hx        Social History     Socioeconomic History   • Marital status:       "Spouse name: Not on file   • Number of children: Not on file   • Years of education: Not on file   • Highest education level: Not on file   Tobacco Use   • Smoking status: Never Smoker   • Smokeless tobacco: Never Used   Substance and Sexual Activity   • Alcohol use: No   • Drug use: No   • Sexual activity: Yes     Partners: Male       Allergies   Allergen Reactions   • Aspirin Hives, Swelling and Rash   • Lisinopril Swelling and Rash         Current Outpatient Medications:   •  atorvastatin (LIPITOR) 40 MG tablet, 40 mg., Disp: , Rfl: 3  •  BYSTOLIC 10 MG tablet, Take 10 mg by mouth Daily. FOR 30 DAYS, Disp: , Rfl: 3  •  calcium carbonate (TUMS) 500 MG chewable tablet, Chew 1 tablet 2 (Two) Times a Day As Needed for Indigestion or Heartburn., Disp: , Rfl:   •  clopidogrel (PLAVIX) 75 MG tablet, Take 1 tablet by mouth Daily., Disp: 30 tablet, Rfl: 11  •  FLUoxetine (PROzac) 10 MG capsule, Take 10 mg by mouth Take As Directed. Only takes during menstrual cycle, Disp: , Rfl:   •  ketorolac (TORADOL) 10 MG tablet, Take 10 mg by mouth Every 6 (Six) Hours As Needed for Moderate Pain  (takes monthly during menstrual cycle)., Disp: , Rfl:   •  LYSINE PO, Take  by mouth Daily., Disp: , Rfl:   •  Multiple Vitamin (MULTI-VITAMIN PO), Take  by mouth Daily., Disp: , Rfl:   •  omeprazole (priLOSEC) 20 MG capsule, Take 20 mg by mouth Daily., Disp: , Rfl:       Review of Systems   Constitutional: Negative.    HENT: Negative.    Eyes: Negative.    Respiratory: Negative.    Cardiovascular: Negative.         Varicosities, right thigh painful area   Gastrointestinal: Negative.    Endocrine: Negative.    Genitourinary: Negative.    Musculoskeletal: Positive for back pain.   Skin: Negative.    Allergic/Immunologic: Negative.    Neurological: Negative.    Hematological: Negative.    Psychiatric/Behavioral: Negative.    All other systems reviewed and are negative.    /78   Pulse 54   Ht 157.5 cm (62\")   Wt 75.3 kg (166 lb)   " SpO2 98%   BMI 30.36 kg/m²     Physical Exam   Constitutional: She is oriented to person, place, and time. She appears well-developed and well-nourished.   HENT:   Head: Normocephalic and atraumatic.   Eyes: Pupils are equal, round, and reactive to light. No scleral icterus.   Neck: Neck supple. No JVD present. Carotid bruit is not present. No thyromegaly present.   Cardiovascular: Normal rate, regular rhythm and normal heart sounds.   Pulses:       Carotid pulses are 2+ on the right side, and 2+ on the left side.       Femoral pulses are 2+ on the right side, and 2+ on the left side.       Popliteal pulses are 2+ on the right side, and 2+ on the left side.        Dorsalis pedis pulses are 2+ on the right side, and 2+ on the left side.        Posterior tibial pulses are 2+ on the right side, and 2+ on the left side.   Varicosities to BLE,    Pulmonary/Chest: Effort normal and breath sounds normal.   Abdominal: Soft. Bowel sounds are normal. She exhibits no distension, no abdominal bruit and no mass. There is no hepatosplenomegaly. There is no tenderness.   Musculoskeletal: Normal range of motion. She exhibits no edema.   Lymphadenopathy:     She has no cervical adenopathy.   Neurological: She is alert and oriented to person, place, and time. She has normal strength. No cranial nerve deficit or sensory deficit.   Skin: Skin is warm, dry and intact.   Psychiatric: She has a normal mood and affect. Her behavior is normal. Judgment and thought content normal.   Nursing note and vitals reviewed.      Patient Active Problem List   Diagnosis   • Cerebrovascular accident (CVA) (CMS/HCC)   • Allergic reaction   • Encounter for screening for malignant neoplasm of colon   • Chronic venous hypertension with inflammation involving right side   • Leg pain, lateral, right        Diagnosis Plan   1. Chronic venous hypertension with inflammation involving right side     2. Leg pain, lateral, right         Plan: After thoroughly  evaluating Judit Mata, I believe the best course of action is to proceed with a 2 leg VVI.  By performing the ultrasound I will be able to evaluate her venous insufficiency in the leg.  Paying specific attention to this lateral area which feels like a potential area of varicosities or may even be a small lipoma.  I will see her back in the next 1 to 2 weeks with this testing to see if any further treatment is necessary.  I gave her a prescription for knee-high compression stockings in the 20 to 30 mm pressure gradient range.  I instructed her on how to use them on a daily basis and to keep her legs elevated when she is not on them. The patient is to continue taking their medications as previously discussed.   This was all discussed in full with complete understanding.  Thank you for allowing me to participate in the care of your patient.  Please do not hesitate to call with any questions or concerns.  We will keep you aware of any further encounters with Judit Mata.        Sincerely yours,         Jozef Perez, Gwendolyn Stevens, KENDELL

## 2019-08-29 ENCOUNTER — TELEPHONE (OUTPATIENT)
Dept: VASCULAR SURGERY | Facility: CLINIC | Age: 49
End: 2019-08-29

## 2019-08-29 NOTE — TELEPHONE ENCOUNTER
Spoke with Mrs Mata reminding her of her appointments for Friday, August 30th, 2019. Reminded Mrs Mata to arrive at the [facility] at 7 am for testing and follow up afterwards at 1030 am with Annamarie RDZ. Mrs Mata confirmed she would be here.

## 2019-08-30 ENCOUNTER — OFFICE VISIT (OUTPATIENT)
Dept: VASCULAR SURGERY | Facility: CLINIC | Age: 49
End: 2019-08-30

## 2019-08-30 ENCOUNTER — HOSPITAL ENCOUNTER (OUTPATIENT)
Dept: ULTRASOUND IMAGING | Facility: HOSPITAL | Age: 49
Discharge: HOME | End: 2019-08-30
Admitting: SURGERY

## 2019-08-30 VITALS
WEIGHT: 168.4 LBS | SYSTOLIC BLOOD PRESSURE: 118 MMHG | OXYGEN SATURATION: 98 % | DIASTOLIC BLOOD PRESSURE: 76 MMHG | BODY MASS INDEX: 29.84 KG/M2 | HEIGHT: 63 IN | HEART RATE: 64 BPM

## 2019-08-30 DIAGNOSIS — I87.321 CHRONIC VENOUS HYPERTENSION WITH INFLAMMATION INVOLVING RIGHT SIDE: ICD-10-CM

## 2019-08-30 DIAGNOSIS — R22.41 MASS OF RIGHT THIGH: Primary | ICD-10-CM

## 2019-08-30 PROCEDURE — 93970 EXTREMITY STUDY: CPT | Performed by: SURGERY

## 2019-08-30 PROCEDURE — 99214 OFFICE O/P EST MOD 30 MIN: CPT | Performed by: NURSE PRACTITIONER

## 2019-08-30 PROCEDURE — 93970 EXTREMITY STUDY: CPT

## 2019-09-02 NOTE — PROGRESS NOTES
"08/30/2019       Gwendolyn Wooten, APRN  [address] [place] [address]    Judit Mata  1970    Chief Complaint   Patient presents with   • Follow-up     1 Week Follow UP Chronic venous hypertension with inflammation involving right side . Test [id number] US pad venous lower ext bilat. Patient denies any stroke like symptoms.        Dear Gwendolyn Wooten, APRN       HPI  I had the pleasure of seeing your patient Judit Mata in the office today.  As you recall, Judit Mata is a 49 y.o.  female who you are currently following for routine health maintenance.  She is following with Michelle RDZ for previous left hemispheric stroke in 2/2018.  She reports pain to right outer thigh in one area.  She does have varicose veins to bilateral lower extremities.  She is maintained on Plavix and Lipitor.  She reports being seen by general surgery regarding the pain to her right lateral thigh.  She denies any previous history of DVT or phlebitis but does have a patent foramen ovale.  She also denies any use of compression stockings in the past.  Her main complaint is a painful area to her right outer thigh.  She did have noninvasive testing performed today, which I did review in office.    Review of Systems   Constitutional: Negative.    HENT: Negative.    Eyes: Negative.    Respiratory: Negative.    Cardiovascular: Negative.         Varicosities, right thigh painful area   Gastrointestinal: Negative.    Endocrine: Negative.    Genitourinary: Negative.    Musculoskeletal: Positive for back pain.   Skin: Negative.    Allergic/Immunologic: Negative.    Neurological: Negative.    Hematological: Negative.    Psychiatric/Behavioral: Negative.    All other systems reviewed and are negative.    /76 (BP Location: Right arm, Patient Position: Sitting, Cuff Size: Adult)   Pulse 64   Ht 160 cm (63\")   Wt 76.4 kg (168 lb 6.4 oz)   SpO2 98%   BMI 29.83 kg/m²     Physical Exam   Constitutional: She is oriented to person, " place, and time. She appears well-developed and well-nourished.   HENT:   Head: Normocephalic and atraumatic.   Eyes: Pupils are equal, round, and reactive to light. No scleral icterus.   Neck: Neck supple. No JVD present. Carotid bruit is not present. No thyromegaly present.   Cardiovascular: Normal rate, regular rhythm and normal heart sounds.   Pulses:       Carotid pulses are 2+ on the right side, and 2+ on the left side.       Femoral pulses are 2+ on the right side, and 2+ on the left side.       Popliteal pulses are 2+ on the right side, and 2+ on the left side.        Dorsalis pedis pulses are 2+ on the right side, and 2+ on the left side.        Posterior tibial pulses are 2+ on the right side, and 2+ on the left side.   Varicosities to BLE,    Pulmonary/Chest: Effort normal and breath sounds normal.   Abdominal: Soft. Bowel sounds are normal. She exhibits no distension, no abdominal bruit and no mass. There is no hepatosplenomegaly. There is no tenderness.   Musculoskeletal: Normal range of motion. She exhibits no edema.   Lymphadenopathy:     She has no cervical adenopathy.   Neurological: She is alert and oriented to person, place, and time. She has normal strength. No cranial nerve deficit or sensory deficit.   Skin: Skin is warm, dry and intact.   Psychiatric: She has a normal mood and affect. Her behavior is normal. Judgment and thought content normal.   Nursing note and vitals reviewed.    Diagnostic Data:  Xr Spine Thoracic 3 View    Result Date: 9/5/2019  Narrative: XR SPINE THORACIC 3 VW- 9/5/2019 5:49 PM CDT  HISTORY: LUMBAGO WITH SCIATICA; M54.41-Lumbago with sciatica, right side  COMPARISON: None  FINDINGS: Frontal, lateral and swimmers lateral radiographs of the thoracic spine demonstrate minimal dextrocurvature without fracture or subluxation. The pedicles are intact. The disc spaces are maintained.  The visualized thorax is clear.      Impression: 1. Minimal dextrocurvature of the thoracic  spine.  This report was finalized on 09/05/2019 18:00 by Dr. Tirso Pal MD.    Us Venous Doppler Lower Extremity Bilateral (duplex)    Result Date: 8/30/2019  Narrative: History: Swelling   Comments: Venous valvular insufficiency testing was performed in the bilateral lower extremities using duplex ultrasound with compression techniques.  The common femoral vein, superficial femoral, popliteal vein, posterior tibial vein, peroneal vein, greater saphenous vein, and lesser saphenous veins were interrogated.  On the right, the greater saphenous vein at the junction measured 6.7mm. In the mid thigh measured 4.9mm. Above the knee measured 5.4mm. In the mid calf measured 3.0mm. At the ankle measured 3.1mm. There is no evidence of venous insufficiency in the greater saphenous vein. The lesser saphenous vein is within normal limits and no evidence of reflux. There is no evidence of DVT. There is evidence of varicosities in zones 2 through 6 with greater than 0.5 seconds of reflux. On the lateral thigh there is a small deep mass measuring 1.4 x 0.7 x 1.3 cm with mixed echogenicity. There is also a collapsed Baker's cyst in the medial popliteal space.  On the left, the greater saphenous vein at the junction measured 7.5mm. In the mid thigh measured 3.4mm. Above the knee measured 4.7mm. In the mid calf measured 2.8mm. At the ankle measured 2.6mm. There is greater than 0.5 seconds of reflux from the knee to the ankle. There is also evidence of varicosities in zone 6 with greater than 0.5 seconds of reflux.The lesser saphenous vein is within normal limits and no evidence of reflux.. There is no evidence of DVT.      Impression: Impression: There is evidence of venous insufficiency in the left lower extremity greater saphenous vein from the knee to the ankle. There is also evidence of varicosities in both lower extremities with reflux. There is a small mass in the right lateral thigh with measurements above.   This report was  finalized on 08/30/2019 13:20 by Dr. Jozef Perez MD.    Xr Spine Lumbar 4+ View    Result Date: 9/5/2019  Narrative: XR SPINE LUMBAR 4+ VW- 9/5/2019 5:49 PM CDT  HISTORY: LUMBAGO WITH SCIATICA; M54.41-Lumbago with sciatica, right side  COMPARISON: None  FINDINGS: Frontal, lateral, bilateral oblique and coned-down lateral views of the lumbar spine are provided. There are presumed to be 5 lumbar vertebral bodies, with the inferior-most visualized disc space being designated as L5-S1 for the purpose of numbering.  There is no evidence of fracture or subluxation. Mild to moderate levocurvature is present with apex at L2-L3. There is no lateral subluxation. Moderate disc space narrowing and osteophyte formation are seen at L2-L3. There is mild disc space narrowing at L5-S1. There is no evidence of pars defect. Degenerative changes are present in the L5-S1 facet joints.  The visualized osseous pelvis and surrounding soft tissues are grossly unremarkable.      Impression: 1. Degenerative changes and mild to moderate levocurvature of the lumbar spine.   This report was finalized on 09/05/2019 18:00 by Dr. Tirso Pal MD.       Patient Active Problem List   Diagnosis   • Cerebrovascular accident (CVA) (CMS/HCC)   • Allergic reaction   • Encounter for screening for malignant neoplasm of colon   • Chronic venous hypertension with inflammation involving right side   • Leg pain, lateral, right        Diagnosis Plan   1. Mass of right thigh         Plan: After thoroughly evaluating Judit Mata, I believe the best course of action is to refer her to general surgery for removal of painful mass to her right thigh.  This is not venous in nature after reviewing her testing.  She does have a history of melanoma excision.   I gave her a prescription for knee-high compression stockings in the 20 to 30 mm pressure gradient range.  I instructed her on how to use them on a daily basis and to keep her legs elevated when she is not  on them.  I will place referral for general surgery.  We will follow-up as needed the patient is to continue taking their medications as previously discussed.   This was all discussed in full with complete understanding.  Thank you for allowing me to participate in the care of your patient.  Please do not hesitate to call with any questions or concerns.  We will keep you aware of any further encounters with Judit Mata.        Sincerely yours,         KENDELL Gunn Cainan G, APRN

## 2019-09-05 ENCOUNTER — TRANSCRIBE ORDERS (OUTPATIENT)
Dept: ADMINISTRATIVE | Facility: HOSPITAL | Age: 49
End: 2019-09-05

## 2019-09-05 ENCOUNTER — HOSPITAL ENCOUNTER (OUTPATIENT)
Dept: GENERAL RADIOLOGY | Facility: HOSPITAL | Age: 49
Discharge: HOME | End: 2019-09-05
Admitting: NURSE PRACTITIONER

## 2019-09-05 DIAGNOSIS — M54.41 RIGHT-SIDED LOW BACK PAIN WITH RIGHT-SIDED SCIATICA, UNSPECIFIED CHRONICITY: ICD-10-CM

## 2019-09-05 DIAGNOSIS — M54.41 RIGHT-SIDED LOW BACK PAIN WITH RIGHT-SIDED SCIATICA, UNSPECIFIED CHRONICITY: Primary | ICD-10-CM

## 2019-09-05 PROCEDURE — 72072 X-RAY EXAM THORAC SPINE 3VWS: CPT

## 2019-09-05 PROCEDURE — 72110 X-RAY EXAM L-2 SPINE 4/>VWS: CPT

## 2019-09-10 DIAGNOSIS — R22.41 MASS OF RIGHT THIGH: Primary | ICD-10-CM

## 2019-09-18 ENCOUNTER — TELEPHONE (OUTPATIENT)
Dept: VASCULAR SURGERY | Facility: CLINIC | Age: 49
End: 2019-09-18

## 2019-09-18 NOTE — TELEPHONE ENCOUNTER
Spoke with pt about appointment with DR. Cunha. Pt expressed she couldn't be seen on that date, she will call their office to get changed.

## 2020-07-14 ENCOUNTER — LAB (OUTPATIENT)
Dept: LAB | Facility: HOSPITAL | Age: 50
End: 2020-07-14

## 2020-07-14 ENCOUNTER — OFFICE VISIT (OUTPATIENT)
Dept: NEUROLOGY | Facility: CLINIC | Age: 50
End: 2020-07-14

## 2020-07-14 VITALS
SYSTOLIC BLOOD PRESSURE: 130 MMHG | HEIGHT: 62 IN | RESPIRATION RATE: 18 BRPM | DIASTOLIC BLOOD PRESSURE: 80 MMHG | WEIGHT: 180 LBS | BODY MASS INDEX: 33.13 KG/M2 | HEART RATE: 62 BPM

## 2020-07-14 DIAGNOSIS — Q21.12 PFO (PATENT FORAMEN OVALE): ICD-10-CM

## 2020-07-14 DIAGNOSIS — I63.9 CEREBROVASCULAR ACCIDENT (CVA), UNSPECIFIED MECHANISM (HCC): Primary | ICD-10-CM

## 2020-07-14 DIAGNOSIS — R20.2 NUMBNESS AND TINGLING IN BOTH HANDS: ICD-10-CM

## 2020-07-14 DIAGNOSIS — R20.0 NUMBNESS AND TINGLING IN BOTH HANDS: ICD-10-CM

## 2020-07-14 LAB — B-HCG UR QL: NEGATIVE

## 2020-07-14 PROCEDURE — 99215 OFFICE O/P EST HI 40 MIN: CPT | Performed by: PSYCHIATRY & NEUROLOGY

## 2020-07-14 PROCEDURE — 81025 URINE PREGNANCY TEST: CPT | Performed by: PSYCHIATRY & NEUROLOGY

## 2020-07-14 RX ORDER — PANTOPRAZOLE SODIUM 40 MG/1
40 TABLET, DELAYED RELEASE ORAL DAILY
Qty: 30 TABLET | Refills: 1 | Status: SHIPPED | OUTPATIENT
Start: 2020-07-14 | End: 2020-10-12 | Stop reason: SDUPTHER

## 2020-07-14 RX ORDER — CLOPIDOGREL BISULFATE 75 MG/1
75 TABLET ORAL DAILY
Qty: 30 TABLET | Refills: 3 | Status: SHIPPED | OUTPATIENT
Start: 2020-07-14

## 2020-07-14 NOTE — PATIENT INSTRUCTIONS
Patient to get emergency room immediately if stroke symptoms occur.  Patient to get with PCP about weight and diet controlled.  Patient to have driving precautions and safety precautions.  Stop omeprazole

## 2020-07-14 NOTE — PROGRESS NOTES
Subjective   Judit Mata, 1970, is a female who is being seen today for   Chief Complaint   Patient presents with   • Stroke       HISTORY OF PRESENT ILLNESS: Extended follow-up.  Patient previously seen in 2018 for left posterior limb internal capsule stroke.  Patient had positive PFO which was not thought to be contributory.  Patient has not seen a cardiologist at this point.  Patient had allergy to aspirin and lisinopril and switched to Plavix.  Patient taking 75 mg 1 p.o. daily.  Patient also taking omeprazole which interacts with the Plavix and so is to discontinue and start Protonix 40 mg daily.  Patient has had no further stroke symptoms.  Patient still has some residual right nasolabial droop and periorbital decreased sensation from the stroke.  Patient has a new problem that is numbness and tingling in her hands bilaterally left greater than right.  She is left-handed.  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  Patient denies any neck injury or neck pain.  She has had low back pain and has some scoliosis and degenerative spine disease there.  Patient has not had  neck x-rays.  Patient notices that the problem wakes her up at night and seems to be in the whole hand especially on the left.  Patient has some type of mass in the right lateral thigh area followed by Dr. Perez but is supposed to follow-up with Dr. Sterling and has not yet.  Patient has had her tubes tied and has irregular periods the last in early May.  Has had a history of migraines in the past but not having headaches now.    REVIEW OF SYSTEMS:   GENERAL: Blood pressure 130/80 left arm seated in same standing pulse 62.  PULMONARY: No acute respiratory difficulty  CVS: As above  GASTROINTESTINAL: No acute GI distress other than reflux  GENITOURINARY: No acute  distress  GYN: As above  MUSCULOSKELETAL: As above  HEENT: No acute hearing or swallowing or vision problems  ENDOCRINE: Not diabetic  PSYCHIATRIC: No acute  psychiatric symptoms  HEMATOLOGY: No anemia  SKIN: No acute skin changes other than as above.  I palpated the uncomfortable area and lateral right thigh.  Patient has some decreased pin sensation over that  and some tenderness to palpation but it was difficult to palpate the mass  Family history reviewed and otherwise noncontributory to this problem  Social history: Patient denies smoking or drug or alcohol use    PHYSICAL EXAMINATION:    GENERAL: No acute distress other than as above  CRANIUM: Normal cephalic/atraumatic  HEENT:       EYES: EOMs intact without nystagmus and fields full to confrontation.  Pupils equal round reactive.  No acute fundic abnormalities.       EARS: Tympanic membranes normal and hears tuning fork bilaterally       THROAT: No acute oropharynx abnormalities       NECK: No bruits/no lymphadenopathy  CHEST: No acute cardiopulmonary abnormalities by auscultation  ABDOMEN: Nondistended  EXTREMITIES: Dorsalis pedis pulse symmetrical.  Negative Tinel's wrist and elbow.  Adson's questionable positive bilaterally  NEURO: Patient alert and follows commands without difficulty  SPEECH: Normal    CRANIAL NERVES: Patient has slight right nasolabial fold droop and slight decrease in pin sensation on the right lower lip area    MOTOR STRENGTH: Motor strength upper and lower extremities shows  3-4/ 5 strength in the left APB but the rest of the upper and lower extremities are normal.    STATION AND GAIT: Patient with normal gait/Romberg negative  CEREBELLAR: Finger-nose and heel-to-shin normal  SENSORY: Patient has decreased pin and vibration in the left upper extremity and whole hand versus right.  Patient has normal pin and vibration elsewhere other than as above  REFLEXES: Reflexes are present symmetric upper and lower extremities no clonus or Babinski      ASSESSMENT AND PLAN: Patient with history of CVA getting opinion on PFO.  Patient to stop the omeprazole and start Protonix.  Patient eventually get  Plavix platelet inhibition and 2 to 3 weeks with the rest of the blood work.  Patient to get a pregnancy test today and possibly needs cervical spine x-rays.  Patient to get nerve conductions and EMG both upper extremities as soon as possible for evaluation possible carpal tunnel on the left greater than right with muscle atrophy and loss as above.  Patient to get thoracic outlet studies.  Patient to continue Plavix.  Patient to get to emergency room immediately if stroke symptoms occur.  Patient to have driving and safety precautions as reviewed.  Patient not to be climbing or using sharp cutting tools and be safety conscious around hot fire/stove/water/grill.  I spent 40 minutes with this patient with 30 minutes counseling.  I did discuss conservative measures for carpal tunnel treatment but with a significant atrophy already available in the left median innervated APB patient may end up needing carpal tunnel surgery if indicated.      Judit was seen today for stroke.    Diagnoses and all orders for this visit:    Cerebrovascular accident (CVA), unspecified mechanism (CMS/HCC)  -     P2Y12 Platelet Inhibition (PPI); Future    PFO (patent foramen ovale)  -     Ambulatory Referral to Cardiology    Numbness and tingling in both hands  -     Magnesium; Future  -     Vitamin B12; Future  -     Folate; Future  -     T4, Free; Future  -     Sedimentation Rate; Future  -     CBC & Differential; Future  -     Comprehensive Metabolic Panel; Future  -     EMG & Nerve Conduction Test; Future  -     Pregnancy, Urine - Urine, Clean Catch    Other orders  -     clopidogrel (PLAVIX) 75 MG tablet; Take 1 tablet by mouth Daily.  -     pantoprazole (Protonix) 40 MG EC tablet; Take 1 tablet by mouth Daily.        Dictated utilizing Dragon voice recognition software

## 2020-07-21 ENCOUNTER — APPOINTMENT (OUTPATIENT)
Dept: NEUROLOGY | Facility: HOSPITAL | Age: 50
End: 2020-07-21

## 2020-07-22 ENCOUNTER — TRANSCRIBE ORDERS (OUTPATIENT)
Dept: ADMINISTRATIVE | Facility: HOSPITAL | Age: 50
End: 2020-07-22

## 2020-07-22 DIAGNOSIS — Z01.818 PRE-OP TESTING: Primary | ICD-10-CM

## 2020-07-23 ENCOUNTER — APPOINTMENT (OUTPATIENT)
Dept: PREADMISSION TESTING | Facility: HOSPITAL | Age: 50
End: 2020-07-23

## 2020-07-23 VITALS
BODY MASS INDEX: 31.84 KG/M2 | DIASTOLIC BLOOD PRESSURE: 81 MMHG | WEIGHT: 179.68 LBS | OXYGEN SATURATION: 98 % | SYSTOLIC BLOOD PRESSURE: 118 MMHG | HEART RATE: 62 BPM | HEIGHT: 63 IN | RESPIRATION RATE: 18 BRPM

## 2020-07-23 PROCEDURE — 93005 ELECTROCARDIOGRAM TRACING: CPT

## 2020-07-23 PROCEDURE — 93010 ELECTROCARDIOGRAM REPORT: CPT | Performed by: INTERNAL MEDICINE

## 2020-07-23 NOTE — DISCHARGE INSTRUCTIONS
DAY OF SURGERY INSTRUCTIONS        YOUR SURGEON: April SHASHI    PROCEDURE: EXCISION OF NODULE RIGHT THIGH    DATE OF SURGERY: 7/30/2020    ARRIVAL TIME: AS DIRECTED BY OFFICE    YOU MAY TAKE THE FOLLOWING MEDICATION(S) THE MORNING OF SURGERY WITH A SIP OF WATER: 0      ALL OTHER HOME MEDICATION CHECK WITH YOUR PHYSICIAN      DO NOT TAKE ANY ERECTILE DYSFUNCTION MEDICATIONS (EX: CIALIS, VIAGRA) 24 HOURS PRIOR TO SURGERY                      MANAGING PAIN AFTER SURGERY    We know you are probably wondering what your pain will be like after surgery.  Following surgery it is unrealistic to expect you will not have pain.   Pain is how our bodies let us know that something is wrong or cautions us to be careful.  That said, our goal is to make your pain tolerable.    Methods we may use to treat your pain include (oral or IV medications, PCAs, epidurals, nerve blocks, etc.)   While some procedures require IV pain medications for a short time after surgery, transitioning to pain medications by mouth allows for better management of pain.   Your nurse will encourage you to take oral pain medications whenever possible.  IV medications work almost immediately, but only last a short while.  Taking medications by mouth allows for a more constant level of medication in your blood stream for a longer period of time.      Once your pain is out of control it is harder to get back under control.  It is important you are aware when your next dose of pain medication is due.  If you are admitted, your nurse may write the time of your next dose on the white board in your room to help you remember.      We are interested in your pain and encourage you to inform us about aggravating factors during your visit.   Many times a simple repositioning every few hours can make a big difference.    If your physician says it is okay, do not let your pain prevent you from getting out of bed. Be sure to call your nurse for assistance prior to  getting up so you do not fall.      Before surgery, please decide your tolerable pain goal.  These faces help describe the pain ratings we use on a 0-10 scale.   Be prepared to tell us your goal and whether or not you take pain or anxiety medications at home.          BEFORE YOU COME TO THE HOSPITAL  (Pre-op instructions)  • Do not eat, drink, smoke or chew gum after midnight the night before surgery.  This also includes no mints.  • Morning of surgery take only the medicines you have been instructed with a sip of water unless otherwise instructed  by your physician.  • Do not shave, wear makeup or dark nail polish.  • Remove all jewelry including rings.  • Leave anything you consider valuable at home.  • Leave your suitcase in the car until after your surgery.  • Bring the following with you if applicable:  o Picture ID and insurance, Medicare or Medicaid cards  o Co-pay/deductible required by insurance (cash, check, credit card)  o Copy of advance directive, living will or power-of- documents if not brought to PAT  o CPAP or BIPAP mask and tubing  o Relaxation aids ( book, magazine), etc.  o Hearing aids                        ON THE DAY OF SURGERY  · On the day of surgery check in at registration located at the main entrance of the hospital.   ? You will be registered and given a beeper with instructions where to wait in the main lobby.  ? When your beeper lights up and vibrates a member of the Outpatient Surgery staff will meet you at the double doors under the stair steps and escort you to your preoperative room.   · You may have cloth compression devices placed on your legs. These help to prevent blood clots and reduce swelling in your legs.  · An IV may be inserted into one of your veins.  · In the operating room, you may be given one or more of the following:  ? A medicine to help you relax (sedative).  ? A medicine to numb the area (local anesthetic).  ? A medicine to make you fall asleep (general  "anesthetic).  ? A medicine that is injected into an area of your body to numb everything below the injection site (regional anesthetic).  · Your surgical site will be marked or identified.  · You may be given an antibiotic through your IV to help prevent infection.  Contact a health care provider if you:  · Develop a fever of more than 100.4°F (38°C) or other feelings of illness during the 48 hours before your surgery.  · Have symptoms that get worse.  Have questions or concerns about your surgery    General Anesthesia/Surgery, Adult  General anesthesia is the use of medicines to make a person \"go to sleep\" (unconscious) for a medical procedure. General anesthesia must be used for certain procedures, and is often recommended for procedures that:  · Last a long time.  · Require you to be still or in an unusual position.  · Are major and can cause blood loss.  The medicines used for general anesthesia are called general anesthetics. As well as making you unconscious for a certain amount of time, these medicines:  · Prevent pain.  · Control your blood pressure.  · Relax your muscles.  Tell a health care provider about:  · Any allergies you have.  · All medicines you are taking, including vitamins, herbs, eye drops, creams, and over-the-counter medicines.  · Any problems you or family members have had with anesthetic medicines.  · Types of anesthetics you have had in the past.  · Any blood disorders you have.  · Any surgeries you have had.  · Any medical conditions you have.  · Any recent upper respiratory, chest, or ear infections.  · Any history of:  ? Heart or lung conditions, such as heart failure, sleep apnea, asthma, or chronic obstructive pulmonary disease (COPD).  ?  service.  ? Depression or anxiety.  · Any tobacco or drug use, including marijuana or alcohol use.  · Whether you are pregnant or may be pregnant.  What are the risks?  Generally, this is a safe procedure. However, problems may occur, " including:  · Allergic reaction.  · Lung and heart problems.  · Inhaling food or liquid from the stomach into the lungs (aspiration).  · Nerve injury.  · Air in the bloodstream, which can lead to stroke.  · Extreme agitation or confusion (delirium) when you wake up from the anesthetic.  · Waking up during your procedure and being unable to move. This is rare.  These problems are more likely to develop if you are having a major surgery or if you have an advanced or serious medical condition. You can prevent some of these complications by answering all of your health care provider's questions thoroughly and by following all instructions before your procedure.  General anesthesia can cause side effects, including:  · Nausea or vomiting.  · A sore throat from the breathing tube.  · Hoarseness.  · Wheezing or coughing.  · Shaking chills.  · Tiredness.  · Body aches.  · Anxiety.  · Sleepiness or drowsiness.  · Confusion or agitation.  RISKS AND COMPLICATIONS OF SURGERY  Your health care provider will discuss possible risks and complications with you before surgery. Common risks and complications include:    · Problems due to the use of anesthetics.  · Blood loss and replacement (does not apply to minor surgical procedures).  · Temporary increase in pain due to surgery.  · Uncorrected pain or problems that the surgery was meant to correct.  · Infection.  · New damage.    What happens before the procedure?    Medicines  Ask your health care provider about:  · Changing or stopping your regular medicines. This is especially important if you are taking diabetes medicines or blood thinners.  · Taking medicines such as aspirin and ibuprofen. These medicines can thin your blood. Do not take these medicines unless your health care provider tells you to take them.  · Taking over-the-counter medicines, vitamins, herbs, and supplements. Do not take these during the week before your procedure unless your health care provider approves  them.  General instructions  · Starting 3-6 weeks before the procedure, do not use any products that contain nicotine or tobacco, such as cigarettes and e-cigarettes. If you need help quitting, ask your health care provider.  · If you brush your teeth on the morning of the procedure, make sure to spit out all of the toothpaste.  · Tell your health care provider if you become ill or develop a cold, cough, or fever.  · If instructed by your health care provider, bring your sleep apnea device with you on the day of your surgery (if applicable).  · Ask your health care provider if you will be going home the same day, the following day, or after a longer hospital stay.  ? Plan to have someone take you home from the hospital or clinic.  ? Plan to have a responsible adult care for you for at least 24 hours after you leave the hospital or clinic. This is important.  What happens during the procedure?  · You will be given anesthetics through both of the following:  ? A mask placed over your nose and mouth.  ? An IV in one of your veins.  · You may receive a medicine to help you relax (sedative).  · After you are unconscious, a breathing tube may be inserted down your throat to help you breathe. This will be removed before you wake up.  · An anesthesia specialist will stay with you throughout your procedure. He or she will:  ? Keep you comfortable and safe by continuing to give you medicines and adjusting the amount of medicine that you get.  ? Monitor your blood pressure, pulse, and oxygen levels to make sure that the anesthetics do not cause any problems.  The procedure may vary among health care providers and hospitals.  What happens after the procedure?  · Your blood pressure, temperature, heart rate, breathing rate, and blood oxygen level will be monitored until the medicines you were given have worn off.  · You will wake up in a recovery area. You may wake up slowly.  · If you feel anxious or agitated, you may be given  medicine to help you calm down.  · If you will be going home the same day, your health care provider may check to make sure you can walk, drink, and urinate.  · Your health care provider will treat any pain or side effects you have before you go home.  · Do not drive for 24 hours if you were given a sedative.  Summary  · General anesthesia is used to keep you still and prevent pain during a procedure.  · It is important to tell your healthcare provider about your medical history and any surgeries you have had, and previous experience with anesthesia.  · Follow your healthcare provider’s instructions about when to stop eating, drinking, or taking certain medicines before your procedure.  · Plan to have someone take you home from the hospital or clinic.  This information is not intended to replace advice given to you by your health care provider. Make sure you discuss any questions you have with your health care provider.  Document Released: 03/26/2009 Document Revised: 08/03/2018 Document Reviewed: 08/03/2018  [employer] Interactive Patient Education © 2019 [employer]       Fall Prevention in Hospitals, Adult  As a hospital patient, your condition and the treatments you receive can increase your risk for falls. Some additional risk factors for falls in a hospital include:  · Being in an unfamiliar environment.  · Being on bed rest.  · Your surgery.  · Taking certain medicines.  · Your tubing requirements, such as intravenous (IV) therapy or catheters.  It is important that you learn how to decrease fall risks while at the hospital. Below are important tips that can help prevent falls.  SAFETY TIPS FOR PREVENTING FALLS  Talk about your risk of falling.  · Ask your health care provider why you are at risk for falling. Is it your medicine, illness, tubing placement, or something else?  · Make a plan with your health care provider to keep you safe from falls.  · Ask your health care provider or pharmacist about side  effects of your medicines. Some medicines can make you dizzy or affect your coordination.  Ask for help.  · Ask for help before getting out of bed. You may need to press your call button.  · Ask for assistance in getting safely to the toilet.  · Ask for a walker or cane to be put at your bedside. Ask that most of the side rails on your bed be placed up before your health care provider leaves the room.  · Ask family or friends to sit with you.  · Ask for things that are out of your reach, such as your glasses, hearing aids, telephone, bedside table, or call button.  Follow these tips to avoid falling:  · Stay lying or seated, rather than standing, while waiting for help.  · Wear rubber-soled slippers or shoes whenever you walk in the hospital.  · Avoid quick, sudden movements.  ¨ Change positions slowly.  ¨ Sit on the side of your bed before standing.  ¨ Stand up slowly and wait before you start to walk.  · Let your health care provider know if there is a spill on the floor.  · Pay careful attention to the medical equipment, electrical cords, and tubes around you.  · When you need help, use your call button by your bed or in the bathroom. Wait for one of your health care providers to help you.  · If you feel dizzy or unsure of your footing, return to bed and wait for assistance.  · Avoid being distracted by the TV, telephone, or another person in your room.  · Do not lean or support yourself on rolling objects, such as IV poles or bedside tables.     This information is not intended to replace advice given to you by your health care provider. Make sure you discuss any questions you have with your health care provider.     Document Released: 12/15/2001 Document Revised: 01/08/2016 Document Reviewed: 08/25/2013  [employer] Interactive Patient Education ©2016 [employer]       [facility]  CHG 4% Patient Instruction Sheet    Chlorhexidine Before Surgery  Chlorhexidine gluconate (CHG) is a germ-killing  (antiseptic) solution that is used to clean the skin. It gets rid of the bacteria that normally live on the skin. Cleaning your skin with CHG before surgery helps lower the risk for infection after surgery.    How to use CHG solution  · You will take 2 showers, one shower the night before surgery, the second shower the morning of surgery before coming to the hospital.  · Use CHG only as told by your health care provider, and follow the instructions on the label.  · Use CHG solution while taking a shower. Follow these steps when using CHG solution (unless your health care provider gives you different instructions):  1. Start the shower.  2. Use your normal soap and shampoo to wash your face and hair.  3. Turn off the shower or move out of the shower stream.  4. Pour the CHG onto a clean washcloth. Do not use any type of brush or rough-edged sponge.  5. Starting at your neck, lather your body down to your toes. Make sure you:  6. Pay special attention to the part of your body where you will be having surgery. Scrub this area for at least 1 minute.  7. Use the full amount of CHG as directed. Usually, this is one half bottle for each shower.  8. Do not use CHG on your head or face. If the solution gets into your ears or eyes, rinse them well with water.  9. Avoid your genital area.  10. Avoid any areas of skin that have broken skin, cuts, or scrapes.  11. Scrub your back and under your arms. Make sure to wash skin folds.  12. Let the lather sit on your skin for 1-2 minutes or as long as told by your health care  provider.  13. Thoroughly rinse your entire body in the shower. Make sure that all body creases and crevices are rinsed well.  14. Dry off with a clean towel. Do not put any substances on your body afterward, such as powder, lotion, or perfume.  15. Put on clean clothes or pajamas.  16. If it is the night before your surgery, sleep in clean sheets.    What are the risks?  Risks of using CHG include:  · A skin  reaction.  · Hearing loss, if CHG gets in your ears.  · Eye injury, if CHG gets in your eyes and is not rinsed out.  · The CHG product catching fire.  Make sure that you avoid smoking and flames after applying CHG to your skin.  Do not use CHG:  · If you have a chlorhexidine allergy or have previously reacted to chlorhexidine.  · On babies younger than 2 months of age.      On the day of surgery, when you are taken to your room in Outpatient Surgery you will be given a CHG prepackaged cloth to wipe the site for your surgery.  How to use CHG prepackaged cloths  · Follow the instructions on the label.  · Use the CHG cloth on clean, dry skin. Follow these steps when using a CHG cloth (unless your health care provider gives you different instructions):  1. Using the CHG cloth, vigorously scrub the part of your body where you will be having surgery. Scrub using a back-and-forth motion for 3 minutes. The area on your body should be completely wet with CHG when you are finished scrubbing.  2. Do not rinse. Discard the cloth and let the area air-dry for 1 minute. Do not put any substances on your body afterward, such as powder, lotion, or perfume.  Contact a health care provider if:  · Your skin gets irritated after scrubbing.  · You have questions about using your solution or cloth.  Get help right away if:  · Your eyes become very red or swollen.  · Your eyes itch badly.  · Your skin itches badly and is red or swollen.  · Your hearing changes.  · You have trouble seeing.  · You have swelling or tingling in your mouth or throat.  · You have trouble breathing.  · You swallow any chlorhexidine.  Summary  · Chlorhexidine gluconate (CHG) is a germ-killing (antiseptic) solution that is used to clean the skin. Cleaning your skin with CHG before surgery helps lower the risk for infection after surgery.  · You may be given CHG to use at home. It may be in a bottle or in a prepackaged cloth to use on your skin. Carefully follow  your health care provider's instructions and the instructions on the product label.  · Do not use CHG if you have a chlorhexidine allergy.  · Contact your health care provider if your skin gets irritated after scrubbing.  This information is not intended to replace advice given to you by your health care provider. Make sure you discuss any questions you have with your health care provider.  Document Released: 09/11/2013 Document Revised: 11/15/2018 Document Reviewed: 11/15/2018  [employer] Interactive Patient Education © 2019 [employer]          PATIENT/FAMILY/RESPONSIBLE PARTY VERBALIZES UNDERSTANDING OF ABOVE EDUCATION.  COPY OF PAIN SCALE GIVEN AND REVIEWED WITH VERBALIZED UNDERSTANDING.

## 2020-07-27 ENCOUNTER — LAB (OUTPATIENT)
Dept: LAB | Facility: HOSPITAL | Age: 50
End: 2020-07-27

## 2020-07-27 LAB
ALBUMIN SERPL-MCNC: 4.3 G/DL (ref 3.5–5.2)
ALBUMIN/GLOB SERPL: 2 G/DL
ALP SERPL-CCNC: 53 U/L (ref 39–117)
ALT SERPL W P-5'-P-CCNC: 14 U/L (ref 1–33)
ANION GAP SERPL CALCULATED.3IONS-SCNC: 10 MMOL/L (ref 5–15)
AST SERPL-CCNC: 18 U/L (ref 1–32)
BASOPHILS # BLD AUTO: 0.05 10*3/MM3 (ref 0–0.2)
BASOPHILS NFR BLD AUTO: 0.7 % (ref 0–1.5)
BILIRUB SERPL-MCNC: 0.3 MG/DL (ref 0–1.2)
BUN SERPL-MCNC: 24 MG/DL (ref 6–20)
BUN/CREAT SERPL: 25.5 (ref 7–25)
CALCIUM SPEC-SCNC: 9.4 MG/DL (ref 8.6–10.5)
CHLORIDE SERPL-SCNC: 103 MMOL/L (ref 98–107)
CO2 SERPL-SCNC: 27 MMOL/L (ref 22–29)
CREAT SERPL-MCNC: 0.94 MG/DL (ref 0.57–1)
DEPRECATED RDW RBC AUTO: 40.9 FL (ref 37–54)
EOSINOPHIL # BLD AUTO: 0.13 10*3/MM3 (ref 0–0.4)
EOSINOPHIL NFR BLD AUTO: 1.8 % (ref 0.3–6.2)
ERYTHROCYTE [DISTWIDTH] IN BLOOD BY AUTOMATED COUNT: 12.6 % (ref 12.3–15.4)
GFR SERPL CREATININE-BSD FRML MDRD: 63 ML/MIN/1.73
GLOBULIN UR ELPH-MCNC: 2.1 GM/DL
GLUCOSE SERPL-MCNC: 95 MG/DL (ref 65–99)
HCT VFR BLD AUTO: 36.7 % (ref 34–46.6)
HGB BLD-MCNC: 12.6 G/DL (ref 12–15.9)
IMM GRANULOCYTES # BLD AUTO: 0.03 10*3/MM3 (ref 0–0.05)
IMM GRANULOCYTES NFR BLD AUTO: 0.4 % (ref 0–0.5)
LYMPHOCYTES # BLD AUTO: 2.87 10*3/MM3 (ref 0.7–3.1)
LYMPHOCYTES NFR BLD AUTO: 39.5 % (ref 19.6–45.3)
MCH RBC QN AUTO: 30.4 PG (ref 26.6–33)
MCHC RBC AUTO-ENTMCNC: 34.3 G/DL (ref 31.5–35.7)
MCV RBC AUTO: 88.6 FL (ref 79–97)
MONOCYTES # BLD AUTO: 0.55 10*3/MM3 (ref 0.1–0.9)
MONOCYTES NFR BLD AUTO: 7.6 % (ref 5–12)
NEUTROPHILS NFR BLD AUTO: 3.63 10*3/MM3 (ref 1.7–7)
NEUTROPHILS NFR BLD AUTO: 50 % (ref 42.7–76)
NRBC BLD AUTO-RTO: 0 /100 WBC (ref 0–0.2)
PLATELET # BLD AUTO: 284 10*3/MM3 (ref 140–450)
PMV BLD AUTO: 8.7 FL (ref 6–12)
POTASSIUM SERPL-SCNC: 4.3 MMOL/L (ref 3.5–5.2)
PROT SERPL-MCNC: 6.4 G/DL (ref 6–8.5)
RBC # BLD AUTO: 4.14 10*6/MM3 (ref 3.77–5.28)
SODIUM SERPL-SCNC: 140 MMOL/L (ref 136–145)
WBC # BLD AUTO: 7.26 10*3/MM3 (ref 3.4–10.8)

## 2020-07-27 PROCEDURE — 85025 COMPLETE CBC W/AUTO DIFF WBC: CPT | Performed by: SPECIALIST

## 2020-07-27 PROCEDURE — C9803 HOPD COVID-19 SPEC COLLECT: HCPCS | Performed by: SPECIALIST

## 2020-07-27 PROCEDURE — 80053 COMPREHEN METABOLIC PANEL: CPT | Performed by: SPECIALIST

## 2020-07-27 PROCEDURE — U0003 INFECTIOUS AGENT DETECTION BY NUCLEIC ACID (DNA OR RNA); SEVERE ACUTE RESPIRATORY SYNDROME CORONAVIRUS 2 (SARS-COV-2) (CORONAVIRUS DISEASE [COVID-19]), AMPLIFIED PROBE TECHNIQUE, MAKING USE OF HIGH THROUGHPUT TECHNOLOGIES AS DESCRIBED BY CMS-2020-01-R: HCPCS | Performed by: SPECIALIST

## 2020-07-27 PROCEDURE — 36415 COLL VENOUS BLD VENIPUNCTURE: CPT

## 2020-07-28 LAB
COVID LABCORP PRIORITY: NORMAL
SARS-COV-2 RNA RESP QL NAA+PROBE: NOT DETECTED

## 2020-07-30 ENCOUNTER — HOSPITAL ENCOUNTER (OUTPATIENT)
Facility: HOSPITAL | Age: 50
Setting detail: HOSPITAL OUTPATIENT SURGERY
Discharge: HOME | End: 2020-07-30
Attending: SPECIALIST | Admitting: SPECIALIST

## 2020-07-30 ENCOUNTER — ANESTHESIA (OUTPATIENT)
Dept: PERIOP | Facility: HOSPITAL | Age: 50
End: 2020-07-30

## 2020-07-30 ENCOUNTER — ANESTHESIA EVENT (OUTPATIENT)
Dept: PERIOP | Facility: HOSPITAL | Age: 50
End: 2020-07-30

## 2020-07-30 VITALS
HEART RATE: 52 BPM | SYSTOLIC BLOOD PRESSURE: 99 MMHG | RESPIRATION RATE: 16 BRPM | TEMPERATURE: 96.3 F | DIASTOLIC BLOOD PRESSURE: 69 MMHG | OXYGEN SATURATION: 98 %

## 2020-07-30 DIAGNOSIS — M79.604 LEG PAIN, LATERAL, RIGHT: Primary | ICD-10-CM

## 2020-07-30 LAB — B-HCG UR QL: NEGATIVE

## 2020-07-30 PROCEDURE — 81025 URINE PREGNANCY TEST: CPT | Performed by: SPECIALIST

## 2020-07-30 PROCEDURE — 25010000002 DEXAMETHASONE PER 1 MG: Performed by: ANESTHESIOLOGY

## 2020-07-30 PROCEDURE — 25010000002 FENTANYL CITRATE (PF) 100 MCG/2ML SOLUTION: Performed by: NURSE ANESTHETIST, CERTIFIED REGISTERED

## 2020-07-30 PROCEDURE — 25010000002 MIDAZOLAM PER 1 MG: Performed by: ANESTHESIOLOGY

## 2020-07-30 PROCEDURE — 25010000002 PROPOFOL 10 MG/ML EMULSION: Performed by: NURSE ANESTHETIST, CERTIFIED REGISTERED

## 2020-07-30 RX ORDER — SODIUM CHLORIDE, SODIUM LACTATE, POTASSIUM CHLORIDE, CALCIUM CHLORIDE 600; 310; 30; 20 MG/100ML; MG/100ML; MG/100ML; MG/100ML
1000 INJECTION, SOLUTION INTRAVENOUS CONTINUOUS
Status: DISCONTINUED | OUTPATIENT
Start: 2020-07-30 | End: 2020-07-30 | Stop reason: HOSPADM

## 2020-07-30 RX ORDER — PROPOFOL 10 MG/ML
VIAL (ML) INTRAVENOUS AS NEEDED
Status: DISCONTINUED | OUTPATIENT
Start: 2020-07-30 | End: 2020-07-30 | Stop reason: SURG

## 2020-07-30 RX ORDER — SODIUM CHLORIDE, SODIUM LACTATE, POTASSIUM CHLORIDE, CALCIUM CHLORIDE 600; 310; 30; 20 MG/100ML; MG/100ML; MG/100ML; MG/100ML
9 INJECTION, SOLUTION INTRAVENOUS CONTINUOUS
Status: DISCONTINUED | OUTPATIENT
Start: 2020-07-30 | End: 2020-07-30 | Stop reason: HOSPADM

## 2020-07-30 RX ORDER — OXYCODONE AND ACETAMINOPHEN 7.5; 325 MG/1; MG/1
2 TABLET ORAL EVERY 4 HOURS PRN
Status: DISCONTINUED | OUTPATIENT
Start: 2020-07-30 | End: 2020-07-30 | Stop reason: HOSPADM

## 2020-07-30 RX ORDER — MAGNESIUM HYDROXIDE 1200 MG/15ML
LIQUID ORAL AS NEEDED
Status: DISCONTINUED | OUTPATIENT
Start: 2020-07-30 | End: 2020-07-30 | Stop reason: HOSPADM

## 2020-07-30 RX ORDER — OXYCODONE AND ACETAMINOPHEN 10; 325 MG/1; MG/1
1 TABLET ORAL ONCE AS NEEDED
Status: DISCONTINUED | OUTPATIENT
Start: 2020-07-30 | End: 2020-07-30 | Stop reason: HOSPADM

## 2020-07-30 RX ORDER — NALOXONE HCL 0.4 MG/ML
0.4 VIAL (ML) INJECTION AS NEEDED
Status: DISCONTINUED | OUTPATIENT
Start: 2020-07-30 | End: 2020-07-30 | Stop reason: HOSPADM

## 2020-07-30 RX ORDER — SCOLOPAMINE TRANSDERMAL SYSTEM 1 MG/1
1 PATCH, EXTENDED RELEASE TRANSDERMAL CONTINUOUS
Status: DISCONTINUED | OUTPATIENT
Start: 2020-07-30 | End: 2020-07-30 | Stop reason: HOSPADM

## 2020-07-30 RX ORDER — IBUPROFEN 600 MG/1
600 TABLET ORAL ONCE AS NEEDED
Status: DISCONTINUED | OUTPATIENT
Start: 2020-07-30 | End: 2020-07-30 | Stop reason: HOSPADM

## 2020-07-30 RX ORDER — LABETALOL HYDROCHLORIDE 5 MG/ML
5 INJECTION, SOLUTION INTRAVENOUS
Status: DISCONTINUED | OUTPATIENT
Start: 2020-07-30 | End: 2020-07-30 | Stop reason: HOSPADM

## 2020-07-30 RX ORDER — DEXTROSE MONOHYDRATE 25 G/50ML
12.5 INJECTION, SOLUTION INTRAVENOUS AS NEEDED
Status: DISCONTINUED | OUTPATIENT
Start: 2020-07-30 | End: 2020-07-30 | Stop reason: HOSPADM

## 2020-07-30 RX ORDER — SODIUM CHLORIDE 0.9 % (FLUSH) 0.9 %
3 SYRINGE (ML) INJECTION AS NEEDED
Status: DISCONTINUED | OUTPATIENT
Start: 2020-07-30 | End: 2020-07-30 | Stop reason: HOSPADM

## 2020-07-30 RX ORDER — FENTANYL CITRATE 50 UG/ML
25 INJECTION, SOLUTION INTRAMUSCULAR; INTRAVENOUS
Status: DISCONTINUED | OUTPATIENT
Start: 2020-07-30 | End: 2020-07-30 | Stop reason: HOSPADM

## 2020-07-30 RX ORDER — OXYCODONE HYDROCHLORIDE AND ACETAMINOPHEN 5; 325 MG/1; MG/1
1-2 TABLET ORAL EVERY 4 HOURS PRN
Qty: 15 TABLET | Refills: 0 | Status: SHIPPED | OUTPATIENT
Start: 2020-07-30 | End: 2020-08-17

## 2020-07-30 RX ORDER — FENTANYL CITRATE 50 UG/ML
INJECTION, SOLUTION INTRAMUSCULAR; INTRAVENOUS AS NEEDED
Status: DISCONTINUED | OUTPATIENT
Start: 2020-07-30 | End: 2020-07-30 | Stop reason: SURG

## 2020-07-30 RX ORDER — ONDANSETRON 2 MG/ML
4 INJECTION INTRAMUSCULAR; INTRAVENOUS ONCE AS NEEDED
Status: DISCONTINUED | OUTPATIENT
Start: 2020-07-30 | End: 2020-07-30 | Stop reason: HOSPADM

## 2020-07-30 RX ORDER — OXYCODONE HYDROCHLORIDE AND ACETAMINOPHEN 5; 325 MG/1; MG/1
1 TABLET ORAL ONCE AS NEEDED
Status: DISCONTINUED | OUTPATIENT
Start: 2020-07-30 | End: 2020-07-30 | Stop reason: HOSPADM

## 2020-07-30 RX ORDER — LIDOCAINE HYDROCHLORIDE 10 MG/ML
0.5 INJECTION, SOLUTION EPIDURAL; INFILTRATION; INTRACAUDAL; PERINEURAL ONCE AS NEEDED
Status: DISCONTINUED | OUTPATIENT
Start: 2020-07-30 | End: 2020-07-30 | Stop reason: HOSPADM

## 2020-07-30 RX ORDER — FLUMAZENIL 0.1 MG/ML
0.2 INJECTION INTRAVENOUS AS NEEDED
Status: DISCONTINUED | OUTPATIENT
Start: 2020-07-30 | End: 2020-07-30 | Stop reason: HOSPADM

## 2020-07-30 RX ORDER — SODIUM CHLORIDE 0.9 % (FLUSH) 0.9 %
10 SYRINGE (ML) INJECTION EVERY 12 HOURS SCHEDULED
Status: DISCONTINUED | OUTPATIENT
Start: 2020-07-30 | End: 2020-07-30 | Stop reason: HOSPADM

## 2020-07-30 RX ORDER — MIDAZOLAM HYDROCHLORIDE 1 MG/ML
1 INJECTION INTRAMUSCULAR; INTRAVENOUS
Status: COMPLETED | OUTPATIENT
Start: 2020-07-30 | End: 2020-07-30

## 2020-07-30 RX ORDER — SODIUM CHLORIDE 0.9 % (FLUSH) 0.9 %
10 SYRINGE (ML) INJECTION AS NEEDED
Status: DISCONTINUED | OUTPATIENT
Start: 2020-07-30 | End: 2020-07-30 | Stop reason: HOSPADM

## 2020-07-30 RX ORDER — DEXAMETHASONE SODIUM PHOSPHATE 4 MG/ML
4 INJECTION, SOLUTION INTRA-ARTICULAR; INTRALESIONAL; INTRAMUSCULAR; INTRAVENOUS; SOFT TISSUE ONCE AS NEEDED
Status: COMPLETED | OUTPATIENT
Start: 2020-07-30 | End: 2020-07-30

## 2020-07-30 RX ADMIN — DEXAMETHASONE SODIUM PHOSPHATE 4 MG: 4 INJECTION, SOLUTION INTRAMUSCULAR; INTRAVENOUS at 07:33

## 2020-07-30 RX ADMIN — MIDAZOLAM 1 MG: 1 INJECTION INTRAMUSCULAR; INTRAVENOUS at 07:45

## 2020-07-30 RX ADMIN — SODIUM CHLORIDE, POTASSIUM CHLORIDE, SODIUM LACTATE AND CALCIUM CHLORIDE 1000 ML: 600; 310; 30; 20 INJECTION, SOLUTION INTRAVENOUS at 06:10

## 2020-07-30 RX ADMIN — CEFAZOLIN 1 G: 1 INJECTION, POWDER, FOR SOLUTION INTRAMUSCULAR; INTRAVENOUS; PARENTERAL at 08:00

## 2020-07-30 RX ADMIN — FENTANYL CITRATE 100 MCG: 50 INJECTION, SOLUTION INTRAMUSCULAR; INTRAVENOUS at 08:00

## 2020-07-30 RX ADMIN — MIDAZOLAM 1 MG: 1 INJECTION INTRAMUSCULAR; INTRAVENOUS at 07:33

## 2020-07-30 RX ADMIN — PROPOFOL 60 MG: 10 INJECTION, EMULSION INTRAVENOUS at 08:07

## 2020-07-30 RX ADMIN — SCOPOLAMINE 1 PATCH: 1 PATCH, EXTENDED RELEASE TRANSDERMAL at 07:32

## 2020-07-30 RX ADMIN — PROPOFOL 80 MG: 10 INJECTION, EMULSION INTRAVENOUS at 08:02

## 2020-07-30 NOTE — ANESTHESIA PREPROCEDURE EVALUATION
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

## 2020-07-30 NOTE — ANESTHESIA POSTPROCEDURE EVALUATION
Patient: Judit Mata    Procedure Summary     Date:  07/30/20 Room / Location:   [facility]  /  [facility]    Anesthesia Start:  0757 Anesthesia Stop:  0820    Procedure:  EXCISION OF NODULE ON RIGHT THIGH (Right ) Diagnosis:  (NODULE)    Surgeon:  Larisa Zepeda MD Provider:  Michael Padilla CRNA    Anesthesia Type:  general ASA Status:  3          Anesthesia Type: general    Vitals  Vitals Value Taken Time   BP 99/69 7/30/2020  8:45 AM   Temp 96.3 °F (35.7 °C) 7/30/2020  8:17 AM   Pulse 56 7/30/2020  8:48 AM   Resp 16 7/30/2020  8:43 AM   SpO2 98 % 7/30/2020  8:48 AM   Vitals shown include unvalidated device data.        Post Anesthesia Care and Evaluation    Patient location during evaluation: PACU  Patient participation: complete - patient participated  Level of consciousness: awake and alert  Pain management: adequate  Airway patency: patent  Anesthetic complications: No anesthetic complications    Cardiovascular status: acceptable  Respiratory status: acceptable  Hydration status: acceptable    Comments: Blood pressure 100/56, pulse 57, temperature 96.3 °F (35.7 °C), temperature source Temporal, resp. rate 16, last menstrual period 07/24/2020, SpO2 98 %, not currently breastfeeding.    Pt discharged from PACU based on viktor score >8

## 2020-08-10 ENCOUNTER — APPOINTMENT (OUTPATIENT)
Dept: ULTRASOUND IMAGING | Facility: HOSPITAL | Age: 50
End: 2020-08-10

## 2020-08-13 ENCOUNTER — APPOINTMENT (OUTPATIENT)
Dept: NEUROLOGY | Facility: HOSPITAL | Age: 50
End: 2020-08-13

## 2020-08-17 ENCOUNTER — OFFICE VISIT (OUTPATIENT)
Dept: CARDIOLOGY | Facility: CLINIC | Age: 50
End: 2020-08-17

## 2020-08-17 VITALS
HEIGHT: 63 IN | SYSTOLIC BLOOD PRESSURE: 110 MMHG | OXYGEN SATURATION: 98 % | WEIGHT: 180 LBS | HEART RATE: 57 BPM | BODY MASS INDEX: 31.89 KG/M2 | DIASTOLIC BLOOD PRESSURE: 62 MMHG

## 2020-08-17 DIAGNOSIS — I63.312 CEREBROVASCULAR ACCIDENT (CVA) DUE TO THROMBOSIS OF LEFT MIDDLE CEREBRAL ARTERY (HCC): ICD-10-CM

## 2020-08-17 DIAGNOSIS — I10 ESSENTIAL HYPERTENSION: ICD-10-CM

## 2020-08-17 DIAGNOSIS — Q21.12 PFO (PATENT FORAMEN OVALE): Primary | ICD-10-CM

## 2020-08-17 PROCEDURE — 99204 OFFICE O/P NEW MOD 45 MIN: CPT | Performed by: INTERNAL MEDICINE

## 2020-08-17 RX ORDER — DICLOFENAC SODIUM 75 MG/1
1 TABLET, DELAYED RELEASE ORAL 2 TIMES DAILY
COMMUNITY
Start: 2020-05-20

## 2020-08-17 NOTE — PROGRESS NOTES
Subjective:     Encounter Date:08/17/2020      Patient ID: Judit Mata is a 50 y.o. female with a history of previous CVA, found to have a PFO, referred here for further evaluation.    Referring provider: Michael Galloway MD  Reason for referral: PFO    Chief Complaint: Here for further evaluation after previous stroke and identification of a PFO    History of Present Illness    This is a 50-year-old female with a previous CVA, leaving her with some facial weakness as well as numbness, occurring about 2 years ago, found to have a PFO at that time, here for further evaluation.  Patient says that she has not had any recurrent strokelike symptoms.  She was placed on aspirin at the time of her event and had a severe allergic reaction but has been maintained on Plavix.  She is also on beta-blocker and statin therapies.  As stated above, she still has some left lower facial numbness and drooping.  No other focal weakness.  No recurrent strokelike symptoms.  No palpitations, lightheadedness, dizziness or syncope.  She does have some degree of chronic lower extremity edema from time to time.  She denies orthopnea, PND.  No chest pain.  Blood pressure is generally well controlled.  The patient does note some numbness in her fingertips which she relates to likely carpal tunnel syndrome.  This is not a new problem for the patient, however.  Otherwise, the patient says that she is doing well and feeling well at this time but she never had follow-up with cardiology after her diagnosis of a PFO.    The following portions of the patient's history were reviewed and updated as appropriate: allergies, current medications, past family history, past medical history, past social history, past surgical history and problem list.     Past Medical History:   Diagnosis Date   • GERD (gastroesophageal reflux disease)    • Hypertension    • Melanoma (CMS/HCC)    • PFO (patent foramen ovale)    • Stroke (CMS/HCC)      Past Surgical History:    Procedure Laterality Date   •  SECTION     • COLONOSCOPY N/A 2019    Procedure: COLONOSCOPY WITH ANESTHESIA;  Surgeon: Adal Theodore DO;  Location:  [facility];  Service: Gastroenterology   • LEG EXCISION LESION/CYST Right 2020    Procedure: EXCISION OF NODULE ON RIGHT THIGH;  Surgeon: Larisa Zepeda MD;  Location:  [facility];  Service: General;  Laterality: Right;   • SKIN CANCER EXCISION         • TONSILLECTOMY     • TUBAL ABDOMINAL LIGATION         Current Outpatient Medications:   •  atorvastatin (LIPITOR) 40 MG tablet, 40 mg., Disp: , Rfl: 3  •  BYSTOLIC 10 MG tablet, Take 10 mg by mouth Daily. FOR 30 DAYS, Disp: , Rfl: 3  •  calcium carbonate (TUMS) 500 MG chewable tablet, Chew 1 tablet 2 (Two) Times a Day As Needed for Indigestion or Heartburn., Disp: , Rfl:   •  clopidogrel (PLAVIX) 75 MG tablet, Take 1 tablet by mouth Daily. (Patient taking differently: Take 75 mg by mouth Daily. HOLD 5 DAYS PRIOR TO SURGERY), Disp: 30 tablet, Rfl: 3  •  diclofenac (VOLTAREN) 75 MG EC tablet, Take 1 tablet by mouth 2 (two) times a day., Disp: , Rfl:   •  FLUoxetine (PROzac) 10 MG capsule, Take 10 mg by mouth Take As Directed. Only takes during menstrual cycle, Disp: , Rfl:   •  Multiple Vitamin (MULTI-VITAMIN PO), Take  by mouth Daily., Disp: , Rfl:   •  pantoprazole (Protonix) 40 MG EC tablet, Take 1 tablet by mouth Daily., Disp: 30 tablet, Rfl: 1    Allergies   Allergen Reactions   • Aspirin Hives, Swelling and Rash   • Lisinopril Swelling and Rash     Social History     Tobacco Use   • Smoking status: Never Smoker   • Smokeless tobacco: Never Used   Substance Use Topics   • Alcohol use: No     Family History   Problem Relation Age of Onset   • No Known Problems Mother    • No Known Problems Father    • Stomach cancer Paternal Uncle    • Esophageal cancer Maternal Grandmother    • Colon cancer Neg Hx      Review of Systems   Constitution: Negative for chills, fever, night sweats and  weight loss.   HENT: Negative for congestion and hearing loss.    Eyes: Negative for blurred vision and pain.   Cardiovascular: Positive for leg swelling. Negative for chest pain, claudication, dyspnea on exertion, orthopnea, palpitations, paroxysmal nocturnal dyspnea and syncope.   Respiratory: Negative for cough, hemoptysis, shortness of breath and wheezing.    Endocrine: Negative for cold intolerance and heat intolerance.   Hematologic/Lymphatic: Negative for adenopathy and bleeding problem. Does not bruise/bleed easily.   Skin: Negative for color change, poor wound healing and rash.   Musculoskeletal: Negative for arthritis, back pain, joint pain, joint swelling, myalgias and neck pain.   Gastrointestinal: Negative for abdominal pain, change in bowel habit, constipation, diarrhea, heartburn, nausea and vomiting.   Genitourinary: Negative for dysuria, frequency, hematuria and nocturia.   Neurological: Positive for numbness. Negative for dizziness, focal weakness, headaches, light-headedness and loss of balance.        Slight right lower facial drooping   Psychiatric/Behavioral: Negative for altered mental status, memory loss and substance abuse.   Allergic/Immunologic: Negative for hives and persistent infections.       Procedures: None today although I did review the ECG from 7/23/2020 showing sinus bradycardia, nonspecific ST changes, ventricular rate 53 bpm       Objective:     Physical Exam   Constitutional: She is oriented to person, place, and time. Vital signs are normal. She appears well-developed and well-nourished. She is cooperative.  Non-toxic appearance. No distress.   HENT:   Head: Normocephalic and atraumatic.   Right Ear: External ear normal.   Left Ear: External ear normal.   Nose: Nose normal.   Mouth/Throat: Uvula is midline, oropharynx is clear and moist and mucous membranes are normal. Mucous membranes are not pale, not dry and not cyanotic. No oropharyngeal exudate.   Eyes: Pupils are equal,  "round, and reactive to light. EOM and lids are normal.   Neck: Normal range of motion. Neck supple. No hepatojugular reflux and no JVD present. Carotid bruit is not present. No tracheal deviation and no edema present. No thyroid mass and no thyromegaly present.   Cardiovascular: Normal rate, regular rhythm, S1 normal, S2 normal, normal heart sounds, intact distal pulses and normal pulses.  No extrasystoles are present. PMI is not displaced. Exam reveals no gallop and no friction rub.   No murmur heard.  Pulses:       Radial pulses are 2+ on the right side, and 2+ on the left side.        Femoral pulses are 2+ on the right side, and 2+ on the left side.       Dorsalis pedis pulses are 2+ on the right side, and 2+ on the left side.        Posterior tibial pulses are 2+ on the right side, and 2+ on the left side.   Pulmonary/Chest: Effort normal and breath sounds normal. No accessory muscle usage. No respiratory distress. She has no wheezes. She has no rales. She exhibits no tenderness.   Abdominal: Soft. Normal appearance and bowel sounds are normal. She exhibits no distension, no abdominal bruit and no pulsatile midline mass. There is no hepatosplenomegaly. There is no tenderness.   Musculoskeletal: Normal range of motion. She exhibits no edema, tenderness or deformity.   Lymphadenopathy:     She has no cervical adenopathy.   Neurological: She is oriented to person, place, and time. She has normal strength. No cranial nerve deficit.   Skin: Skin is warm, dry and intact. No rash noted. She is not diaphoretic. No cyanosis or erythema. Nails show no clubbing.   Psychiatric: She has a normal mood and affect. Her speech is normal and behavior is normal. Thought content normal.   Vitals reviewed.    /62   Pulse 57   Ht 160 cm (63\")   Wt 81.6 kg (180 lb)   LMP 07/24/2020   SpO2 98%   BMI 31.89 kg/m²     Data/Lab Review:     Echocardiogram on 2/28/2018: Normal left ventricular systolic function, no significant " valvular abnormalities, saline contrast bubble study suggest a PFO.        Assessment:          Diagnosis Plan   1. PFO (patent foramen ovale)     2. Cerebrovascular accident (CVA) due to thrombosis of left middle cerebral artery (CMS/HCC)     3. Essential hypertension          Plan:       1.  PFO: The patient was found to have a PFO approximately 2-1/2 years ago when she had her stroke.  She has not had any recurrent strokelike symptoms.  While PFO's may be a mechanism for possible development of strokes, with only one isolated event and no recurrent strokelike symptoms, I would be hesitant to pursue PFO closure at this particular time.  We did discuss that there could be some indications in the future for considering this, especially if she has any recurrent strokelike events.  However, after talking with the patient extensively, we are both agreeable that we will not proceed with PFO closure at this particular time.  I did asked the patient to call return, however, if she develops any recurrent strokelike symptoms as we could reconsider this at some point in the future.    2.  Cerebrovascular accident: The patient is stable at this time.  She continues to follow with neurology.  She remains on Plavix as she is allergic to aspirin.    3.  Essential hypertension: Blood pressure is well controlled.  No changes at this time.    Patient's Body mass index is 31.89 kg/m². BMI is above normal parameters. Recommendations include: exercise counseling and nutrition counseling.    Follow-up as needed at this time but would be happy to see the patient again if she develops any cardiac issues in the future.

## 2020-10-12 RX ORDER — PANTOPRAZOLE SODIUM 40 MG/1
40 TABLET, DELAYED RELEASE ORAL DAILY
Qty: 30 TABLET | Refills: 1 | Status: SHIPPED | OUTPATIENT
Start: 2020-10-12

## 2020-12-03 ENCOUNTER — DOCUMENTATION (OUTPATIENT)
Dept: NEUROLOGY | Facility: CLINIC | Age: 50
End: 2020-12-03

## 2020-12-03 NOTE — PROGRESS NOTES
I have left a message with the patient letting her know her pharmacy has sent in a request for a refill for Protonix 40mg.  I did let her know we need for her to contact our office and let us know if she is going to follow with .  She has canceled 3 appointments with .  We are awaiting her call back.

## 2021-07-02 ENCOUNTER — OFFICE VISIT (OUTPATIENT)
Dept: NEUROLOGY | Facility: CLINIC | Age: 51
End: 2021-07-02

## 2021-07-02 VITALS
SYSTOLIC BLOOD PRESSURE: 122 MMHG | WEIGHT: 182 LBS | RESPIRATION RATE: 18 BRPM | HEART RATE: 54 BPM | HEIGHT: 63 IN | BODY MASS INDEX: 32.25 KG/M2 | DIASTOLIC BLOOD PRESSURE: 84 MMHG

## 2021-07-02 DIAGNOSIS — I63.9 CEREBROVASCULAR ACCIDENT (CVA), UNSPECIFIED MECHANISM (HCC): Primary | ICD-10-CM

## 2021-07-02 PROCEDURE — 99214 OFFICE O/P EST MOD 30 MIN: CPT | Performed by: PSYCHIATRY & NEUROLOGY

## 2021-07-02 RX ORDER — PANTOPRAZOLE SODIUM 40 MG/1
40 TABLET, DELAYED RELEASE ORAL DAILY
Qty: 30 TABLET | Refills: 1 | Status: CANCELLED | OUTPATIENT
Start: 2021-07-02

## 2021-07-02 RX ORDER — CLOPIDOGREL BISULFATE 75 MG/1
75 TABLET ORAL DAILY
Qty: 30 TABLET | Refills: 3 | Status: CANCELLED | OUTPATIENT
Start: 2021-07-02

## 2021-07-02 NOTE — PROGRESS NOTES
Subjective   Judit Mata, 1970, is a female who is being seen today for   Chief Complaint   Patient presents with   • Stroke       HISTORY OF PRESENT ILLNESS: Extended follow-up.  Patient has not had any further stroke symptoms.  Numbness and tingling are.  Patient has had no further problems in fact improvement in the left hand median innervated muscles.  Patient did not get her EMG and nerve conduction done because she said she cannot afford it.  Patient had baseline blood work last year essentially noncontributory.  Patient recently had extended menses earlier this year and saw her GYN who did some thyroid work-up.  She may have had some blood work done in December per her PCP but we do not have that for review.    REVIEW OF SYSTEMS:   GENERAL: Patient blood pressure 118/80 left arm seated 122/84 left arm standing with pulse 54  PULMONARY: No acute respiratory difficulty  CVS: No acute chest pain or palpitation  GASTROINTESTINAL: No acute GI distress  GENITOURINARY: No acute  distress  GYN: As above.  Last menses was in June  MUSCULOSKELETAL: No acute musculoskeletal symptoms  HEENT: No acute vision or hearing change  ENDOCRINE: Not diabetic  PSYCHIATRIC: No acute psychiatric symptoms  HEMATOLOGY: No recent CBC for review  SKIN: No acute skin changes  Family history reviewed and otherwise noncontributory to this problem  Social history: Patient denies smoking or drug or alcohol use      PHYSICAL EXAMINATION:    GENERAL: No acute distress  CRANIUM: Normal cephalic/atraumatic  HEENT:       EYES: EOMs intact without nystagmus and fields full to confrontation.  No acute fundic abnormalities.  Pupils equal round reactive to light.       EARS: Tympanic membrane on the left obscured by wax but hears tuning fork bilaterally       THROAT: No acute oropharynx abnormalities no swallowing problems but history       NECK: No bruits/no lymphadenopathy  CHEST: No acute cardiopulmonary abnormalities by  auscultation  ABDOMEN: Nondistended  EXTREMITIES: Dorsalis pedis pulse symmetrical  NEURO: Patient alert follows commands without difficulty  SPEECH: Normal    CRANIAL NERVES: Motor/sensory about the face normal and symmetric    MOTOR STRENGTH: Motor strength upper and lower extremities normal.  There is no APB atrophy and no weakness in the median innervated muscles on the left  STATION AND GAIT: Gait normal/Romberg negative  CEREBELLAR: Finger-nose and heel-to-shin normal  SENSORY: Patient now has normal pin and vibration in the upper extremities bilaterally.  Patient has slight decrease in pin and vibration distal proximal lower extremities to mid foot bilaterally  REFLEXES: Reflexes are present symmetric upper lower extremity without clonus or Babinski      ASSESSMENT AND PLAN: Patient with history of stroke.  Patient is to have stroke precautions.  Patient's median nerve problems seem to have resolved in the left hand.  Patient to get appropriate blood work.  Patient's PCP is writing all her medications at this time.  I spent 30 minutes with this patient with record review and exam and counseling.  Patient's BMI elevated and follow-up with PCP on that.      Diagnoses and all orders for this visit:    1. Cerebrovascular accident (CVA), unspecified mechanism (CMS/HCC) (Primary)  -     CBC & Differential  -     Comprehensive Metabolic Panel  -     P2Y12 Platelet Inhibition (PPI)    Other orders  -     Cancel: pantoprazole (Protonix) 40 MG EC tablet; Take 1 tablet by mouth Daily.  Dispense: 30 tablet; Refill: 1  -     Cancel: clopidogrel (PLAVIX) 75 MG tablet; Take 1 tablet by mouth Daily.  Dispense: 30 tablet; Refill: 3        Dictated utilizing Dragon voice recognition software

## 2021-07-02 NOTE — PATIENT INSTRUCTIONS
Patient to continue stroke precautions and get to emergency room immediately if stroke symptoms occur.  Patient to follow-up with PCP about weight and diet control and about follow-up on the Protonix and Plavix/Lipitor

## 2022-12-02 ENCOUNTER — APPOINTMENT (OUTPATIENT)
Dept: CT IMAGING | Age: 52
End: 2022-12-02
Payer: OTHER MISCELLANEOUS

## 2022-12-02 ENCOUNTER — HOSPITAL ENCOUNTER (EMERGENCY)
Age: 52
Discharge: ANOTHER HOSPITAL | End: 2022-12-02
Attending: EMERGENCY MEDICINE
Payer: OTHER MISCELLANEOUS

## 2022-12-02 VITALS
WEIGHT: 180 LBS | HEIGHT: 62 IN | SYSTOLIC BLOOD PRESSURE: 110 MMHG | TEMPERATURE: 98.6 F | DIASTOLIC BLOOD PRESSURE: 76 MMHG | BODY MASS INDEX: 33.13 KG/M2 | RESPIRATION RATE: 18 BRPM | HEART RATE: 72 BPM | OXYGEN SATURATION: 93 %
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
## 2022-12-02 DIAGNOSIS — S30.0XXA CONTUSION OF PELVIS, INITIAL ENCOUNTER: ICD-10-CM

## 2022-12-02 DIAGNOSIS — R77.8 ELEVATED TROPONIN LEVEL: ICD-10-CM

## 2022-12-02 DIAGNOSIS — S27.329A CONTUSION OF LUNG, UNSPECIFIED LATERALITY, INITIAL ENCOUNTER: ICD-10-CM

## 2022-12-02 LAB
ALBUMIN SERPL-MCNC: 4.4 G/DL (ref 3.5–5.2)
ALP BLD-CCNC: 99 U/L (ref 35–104)
ALT SERPL-CCNC: 28 U/L (ref 5–33)
ANION GAP SERPL CALCULATED.3IONS-SCNC: 9 MMOL/L (ref 7–19)
AST SERPL-CCNC: 26 U/L (ref 5–32)
BASOPHILS ABSOLUTE: 0 K/UL (ref 0–0.2)
BASOPHILS RELATIVE PERCENT: 0.3 % (ref 0–1)
BILIRUB SERPL-MCNC: 0.6 MG/DL (ref 0.2–1.2)
BUN BLDV-MCNC: 14 MG/DL (ref 6–20)
CALCIUM SERPL-MCNC: 9.8 MG/DL (ref 8.6–10)
CHLORIDE BLD-SCNC: 98 MMOL/L (ref 98–111)
CO2: 27 MMOL/L (ref 22–29)
CREAT SERPL-MCNC: 0.8 MG/DL (ref 0.5–0.9)
EOSINOPHILS ABSOLUTE: 0.1 K/UL (ref 0–0.6)
EOSINOPHILS RELATIVE PERCENT: 0.8 % (ref 0–5)
GFR SERPL CREATININE-BSD FRML MDRD: >60 ML/MIN/{1.73_M2}
GLUCOSE BLD-MCNC: 102 MG/DL (ref 74–109)
HCT VFR BLD CALC: 42.9 % (ref 37–47)
HEMOGLOBIN: 13.6 G/DL (ref 12–16)
IMMATURE GRANULOCYTES #: 0.1 K/UL
LYMPHOCYTES ABSOLUTE: 1.7 K/UL (ref 1.1–4.5)
LYMPHOCYTES RELATIVE PERCENT: 13.9 % (ref 20–40)
MCH RBC QN AUTO: 28.8 PG (ref 27–31)
MCHC RBC AUTO-ENTMCNC: 31.7 G/DL (ref 33–37)
MCV RBC AUTO: 90.7 FL (ref 81–99)
MONOCYTES ABSOLUTE: 0.8 K/UL (ref 0–0.9)
MONOCYTES RELATIVE PERCENT: 6.8 % (ref 0–10)
NEUTROPHILS ABSOLUTE: 9.6 K/UL (ref 1.5–7.5)
NEUTROPHILS RELATIVE PERCENT: 77.6 % (ref 50–65)
PDW BLD-RTO: 12.5 % (ref 11.5–14.5)
PLATELET # BLD: 273 K/UL (ref 130–400)
PMV BLD AUTO: 8.7 FL (ref 9.4–12.3)
POTASSIUM SERPL-SCNC: 4 MMOL/L (ref 3.5–5)
RBC # BLD: 4.73 M/UL (ref 4.2–5.4)
SARS-COV-2, NAAT: NOT DETECTED
SODIUM BLD-SCNC: 134 MMOL/L (ref 136–145)
TOTAL PROTEIN: 6.9 G/DL (ref 6.6–8.7)
TROPONIN: 0.11 NG/ML (ref 0–0.03)
WBC # BLD: 12.4 K/UL (ref 4.8–10.8)

## 2022-12-02 PROCEDURE — 74177 CT ABD & PELVIS W/CONTRAST: CPT

## 2022-12-02 PROCEDURE — 85025 COMPLETE CBC W/AUTO DIFF WBC: CPT

## 2022-12-02 PROCEDURE — 84484 ASSAY OF TROPONIN QUANT: CPT

## 2022-12-02 PROCEDURE — 87635 SARS-COV-2 COVID-19 AMP PRB: CPT

## 2022-12-02 PROCEDURE — 99285 EMERGENCY DEPT VISIT HI MDM: CPT

## 2022-12-02 PROCEDURE — 80053 COMPREHEN METABOLIC PANEL: CPT

## 2022-12-02 PROCEDURE — 72125 CT NECK SPINE W/O DYE: CPT | Performed by: RADIOLOGY

## 2022-12-02 PROCEDURE — 6370000000 HC RX 637 (ALT 250 FOR IP): Performed by: EMERGENCY MEDICINE

## 2022-12-02 PROCEDURE — 71260 CT THORAX DX C+: CPT

## 2022-12-02 PROCEDURE — 72125 CT NECK SPINE W/O DYE: CPT

## 2022-12-02 PROCEDURE — 36415 COLL VENOUS BLD VENIPUNCTURE: CPT

## 2022-12-02 PROCEDURE — 6360000004 HC RX CONTRAST MEDICATION: Performed by: EMERGENCY MEDICINE

## 2022-12-02 PROCEDURE — 93005 ELECTROCARDIOGRAM TRACING: CPT | Performed by: EMERGENCY MEDICINE

## 2022-12-02 RX ORDER — ACETAMINOPHEN 160 MG/5ML
650 SOLUTION ORAL ONCE
Status: COMPLETED | OUTPATIENT
Start: 2022-12-02 | End: 2022-12-02

## 2022-12-02 RX ORDER — ACETAMINOPHEN 500 MG
1000 TABLET ORAL ONCE
Status: COMPLETED | OUTPATIENT
Start: 2022-12-02 | End: 2022-12-02

## 2022-12-02 RX ADMIN — IOPAMIDOL 70 ML: 755 INJECTION, SOLUTION INTRAVENOUS at 13:40

## 2022-12-02 RX ADMIN — ACETAMINOPHEN 500 MG: 500 TABLET ORAL at 15:31

## 2022-12-02 RX ADMIN — ACETAMINOPHEN 650 MG: 325 SOLUTION ORAL at 16:21

## 2022-12-02 ASSESSMENT — PAIN SCALES - GENERAL
PAINLEVEL_OUTOF10: 5
PAINLEVEL_OUTOF10: 6

## 2022-12-02 ASSESSMENT — ENCOUNTER SYMPTOMS
CONSTIPATION: 0
SINUS PRESSURE: 0
EYE DISCHARGE: 0
VOICE CHANGE: 0
ABDOMINAL PAIN: 1
APNEA: 0
COUGH: 0
CHOKING: 0
SORE THROAT: 0
NAUSEA: 0
SHORTNESS OF BREATH: 0
DIARRHEA: 0
BLOOD IN STOOL: 0
FACIAL SWELLING: 0
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
## 2022-12-05 LAB
EKG P AXIS: 64 DEGREES
EKG P-R INTERVAL: 160 MS
EKG Q-T INTERVAL: 384 MS
EKG QRS DURATION: 86 MS
EKG QTC CALCULATION (BAZETT): 413 MS
EKG T AXIS: 50 DEGREES

## 2022-12-05 PROCEDURE — 93010 ELECTROCARDIOGRAM REPORT: CPT | Performed by: INTERNAL MEDICINE

## (undated) DEVICE — SENSR O2 OXIMAX FNGR A/ 18IN NONSTR

## (undated) DEVICE — PK TURNOVER RM ADV

## (undated) DEVICE — MASK,OXYGEN,MED CONC,ADLT,7' TUB, UC: Brand: PENDING

## (undated) DEVICE — YANKAUER,BULB TIP WITH VENT: Brand: ARGYLE

## (undated) DEVICE — Device: Brand: DEFENDO AIR/WATER/SUCTION AND BIOPSY VALVE

## (undated) DEVICE — THE CHANNEL CLEANING BRUSH IS A NYLON FLEXI BRUSH ATTACHED TO A FLEXIBLE PLASTIC SHEATH DESIGNED TO SAFELY REMOVE DEBRIS FROM FLEXIBLE ENDOSCOPES.

## (undated) DEVICE — ENDOGATOR AUXILIARY WATER JET CONNECTOR: Brand: ENDOGATOR

## (undated) DEVICE — TBG SMPL FLTR LINE NASL 02/C02 A/ BX/100

## (undated) DEVICE — PAD MINOR UNIVERSAL: Brand: MEDLINE INDUSTRIES, INC.

## (undated) DEVICE — VAGINAL PREP TRAY: Brand: MEDLINE INDUSTRIES, INC.

## (undated) DEVICE — GLV SURG BIOGEL LTX PF 6 1/2

## (undated) DEVICE — ELECTRD BLD EDGE/INSUL1P 2.4X5.1MM STRL